# Patient Record
Sex: FEMALE | Race: WHITE | NOT HISPANIC OR LATINO | Employment: OTHER
[De-identification: names, ages, dates, MRNs, and addresses within clinical notes are randomized per-mention and may not be internally consistent; named-entity substitution may affect disease eponyms.]

---

## 2017-02-28 ASSESSMENT — MIFFLIN-ST. JEOR: SCORE: 1819.92

## 2017-03-01 ENCOUNTER — RECORDS - HEALTHEAST (OUTPATIENT)
Dept: ADMINISTRATIVE | Facility: OTHER | Age: 72
End: 2017-03-01

## 2017-03-06 ENCOUNTER — ANESTHESIA - HEALTHEAST (OUTPATIENT)
Dept: SURGERY | Facility: CLINIC | Age: 72
End: 2017-03-06

## 2017-03-06 ENCOUNTER — SURGERY - HEALTHEAST (OUTPATIENT)
Dept: SURGERY | Facility: CLINIC | Age: 72
End: 2017-03-06

## 2017-03-07 ENCOUNTER — HOME CARE/HOSPICE - HEALTHEAST (OUTPATIENT)
Dept: HOME HEALTH SERVICES | Facility: HOME HEALTH | Age: 72
End: 2017-03-07

## 2017-03-07 ASSESSMENT — MIFFLIN-ST. JEOR: SCORE: 1819.92

## 2017-03-09 ENCOUNTER — HOME CARE/HOSPICE - HEALTHEAST (OUTPATIENT)
Dept: HOME HEALTH SERVICES | Facility: HOME HEALTH | Age: 72
End: 2017-03-09

## 2017-03-11 ENCOUNTER — HOME CARE/HOSPICE - HEALTHEAST (OUTPATIENT)
Dept: HOME HEALTH SERVICES | Facility: HOME HEALTH | Age: 72
End: 2017-03-11

## 2017-03-14 ENCOUNTER — HOME CARE/HOSPICE - HEALTHEAST (OUTPATIENT)
Dept: HOME HEALTH SERVICES | Facility: HOME HEALTH | Age: 72
End: 2017-03-14

## 2017-03-16 ENCOUNTER — HOME CARE/HOSPICE - HEALTHEAST (OUTPATIENT)
Dept: HOME HEALTH SERVICES | Facility: HOME HEALTH | Age: 72
End: 2017-03-16

## 2017-03-17 ENCOUNTER — HOME CARE/HOSPICE - HEALTHEAST (OUTPATIENT)
Dept: HOME HEALTH SERVICES | Facility: HOME HEALTH | Age: 72
End: 2017-03-17

## 2017-03-21 ENCOUNTER — HOME CARE/HOSPICE - HEALTHEAST (OUTPATIENT)
Dept: HOME HEALTH SERVICES | Facility: HOME HEALTH | Age: 72
End: 2017-03-21

## 2017-03-24 ENCOUNTER — HOME CARE/HOSPICE - HEALTHEAST (OUTPATIENT)
Dept: HOME HEALTH SERVICES | Facility: HOME HEALTH | Age: 72
End: 2017-03-24

## 2017-04-12 ENCOUNTER — RECORDS - HEALTHEAST (OUTPATIENT)
Dept: ADMINISTRATIVE | Facility: OTHER | Age: 72
End: 2017-04-12

## 2017-05-01 ENCOUNTER — AMBULATORY - HEALTHEAST (OUTPATIENT)
Dept: CARDIOLOGY | Facility: CLINIC | Age: 72
End: 2017-05-01

## 2017-05-04 ENCOUNTER — AMBULATORY - HEALTHEAST (OUTPATIENT)
Dept: CARDIOLOGY | Facility: CLINIC | Age: 72
End: 2017-05-04

## 2017-05-05 ENCOUNTER — OFFICE VISIT - HEALTHEAST (OUTPATIENT)
Dept: CARDIOLOGY | Facility: CLINIC | Age: 72
End: 2017-05-05

## 2017-05-05 ENCOUNTER — AMBULATORY - HEALTHEAST (OUTPATIENT)
Dept: CARDIOLOGY | Facility: CLINIC | Age: 72
End: 2017-05-05

## 2017-05-05 DIAGNOSIS — N17.9 AKI (ACUTE KIDNEY INJURY) (H): ICD-10-CM

## 2017-05-05 DIAGNOSIS — K75.81 NONALCOHOLIC STEATOHEPATITIS (NASH): ICD-10-CM

## 2017-05-05 DIAGNOSIS — I47.10 SVT (SUPRAVENTRICULAR TACHYCARDIA) (H): ICD-10-CM

## 2017-05-05 DIAGNOSIS — I48.0 PAROXYSMAL ATRIAL FIBRILLATION (H): ICD-10-CM

## 2017-05-05 DIAGNOSIS — I48.91 A-FIB (H): ICD-10-CM

## 2017-05-05 RX ORDER — LORATADINE 10 MG/1
10 TABLET ORAL DAILY
Status: SHIPPED | COMMUNITY
Start: 2017-05-05

## 2017-05-05 ASSESSMENT — MIFFLIN-ST. JEOR: SCORE: 1770.03

## 2017-05-08 LAB
ATRIAL RATE - MUSE: 89 BPM
DIASTOLIC BLOOD PRESSURE - MUSE: NORMAL MMHG
INTERPRETATION ECG - MUSE: NORMAL
P AXIS - MUSE: 52 DEGREES
PR INTERVAL - MUSE: 170 MS
QRS DURATION - MUSE: 78 MS
QT - MUSE: 350 MS
QTC - MUSE: 425 MS
R AXIS - MUSE: -16 DEGREES
SYSTOLIC BLOOD PRESSURE - MUSE: NORMAL MMHG
T AXIS - MUSE: 23 DEGREES
VENTRICULAR RATE- MUSE: 89 BPM

## 2017-06-06 ENCOUNTER — AMBULATORY - HEALTHEAST (OUTPATIENT)
Dept: ADMINISTRATIVE | Facility: REHABILITATION | Age: 72
End: 2017-06-06

## 2017-06-06 DIAGNOSIS — I89.0 LYMPHEDEMA: ICD-10-CM

## 2017-06-06 DIAGNOSIS — R60.9 EDEMA: ICD-10-CM

## 2017-06-14 ENCOUNTER — OFFICE VISIT - HEALTHEAST (OUTPATIENT)
Dept: PHYSICAL THERAPY | Facility: REHABILITATION | Age: 72
End: 2017-06-14

## 2017-06-14 DIAGNOSIS — R60.9 EDEMA: ICD-10-CM

## 2017-06-14 DIAGNOSIS — I89.0 LYMPHEDEMA: ICD-10-CM

## 2017-06-14 DIAGNOSIS — Z96.651 STATUS POST TOTAL RIGHT KNEE REPLACEMENT: ICD-10-CM

## 2017-06-29 ENCOUNTER — OFFICE VISIT - HEALTHEAST (OUTPATIENT)
Dept: PHYSICAL THERAPY | Facility: REHABILITATION | Age: 72
End: 2017-06-29

## 2017-06-29 DIAGNOSIS — I89.0 LYMPHEDEMA: ICD-10-CM

## 2017-07-03 ENCOUNTER — OFFICE VISIT - HEALTHEAST (OUTPATIENT)
Dept: PHYSICAL THERAPY | Facility: REHABILITATION | Age: 72
End: 2017-07-03

## 2017-07-03 DIAGNOSIS — I89.0 LYMPHEDEMA: ICD-10-CM

## 2017-07-03 DIAGNOSIS — Z96.651 STATUS POST TOTAL RIGHT KNEE REPLACEMENT: ICD-10-CM

## 2017-07-05 ENCOUNTER — OFFICE VISIT - HEALTHEAST (OUTPATIENT)
Dept: PHYSICAL THERAPY | Facility: REHABILITATION | Age: 72
End: 2017-07-05

## 2017-07-05 DIAGNOSIS — I89.0 LYMPHEDEMA: ICD-10-CM

## 2017-07-05 DIAGNOSIS — R60.9 EDEMA: ICD-10-CM

## 2017-07-05 DIAGNOSIS — Z96.651 STATUS POST TOTAL RIGHT KNEE REPLACEMENT: ICD-10-CM

## 2017-07-07 ENCOUNTER — OFFICE VISIT - HEALTHEAST (OUTPATIENT)
Dept: PHYSICAL THERAPY | Facility: REHABILITATION | Age: 72
End: 2017-07-07

## 2017-07-07 DIAGNOSIS — I89.0 LYMPHEDEMA: ICD-10-CM

## 2017-07-07 DIAGNOSIS — R60.9 EDEMA: ICD-10-CM

## 2017-07-07 DIAGNOSIS — Z96.651 STATUS POST TOTAL RIGHT KNEE REPLACEMENT: ICD-10-CM

## 2017-07-10 ENCOUNTER — OFFICE VISIT - HEALTHEAST (OUTPATIENT)
Dept: PHYSICAL THERAPY | Facility: REHABILITATION | Age: 72
End: 2017-07-10

## 2017-07-10 DIAGNOSIS — Z96.651 STATUS POST TOTAL RIGHT KNEE REPLACEMENT: ICD-10-CM

## 2017-07-10 DIAGNOSIS — I89.0 LYMPHEDEMA: ICD-10-CM

## 2017-08-11 ENCOUNTER — OFFICE VISIT - HEALTHEAST (OUTPATIENT)
Dept: CARDIOLOGY | Facility: CLINIC | Age: 72
End: 2017-08-11

## 2017-08-11 DIAGNOSIS — I48.0 PAROXYSMAL ATRIAL FIBRILLATION (H): ICD-10-CM

## 2017-08-11 DIAGNOSIS — R60.0 PERIPHERAL EDEMA: ICD-10-CM

## 2017-08-11 DIAGNOSIS — I47.10 SVT (SUPRAVENTRICULAR TACHYCARDIA) (H): ICD-10-CM

## 2017-08-11 ASSESSMENT — MIFFLIN-ST. JEOR: SCORE: 1824.46

## 2017-08-25 ENCOUNTER — COMMUNICATION - HEALTHEAST (OUTPATIENT)
Dept: CARDIOLOGY | Facility: CLINIC | Age: 72
End: 2017-08-25

## 2017-10-27 ENCOUNTER — HOSPITAL ENCOUNTER (OUTPATIENT)
Dept: MAMMOGRAPHY | Facility: HOSPITAL | Age: 72
Discharge: HOME | End: 2017-10-27

## 2017-10-27 DIAGNOSIS — Z12.31 VISIT FOR SCREENING MAMMOGRAM: ICD-10-CM

## 2017-11-24 ENCOUNTER — AMBULATORY - HEALTHEAST (OUTPATIENT)
Dept: CARDIOLOGY | Facility: CLINIC | Age: 72
End: 2017-11-24

## 2017-11-24 ENCOUNTER — RECORDS - HEALTHEAST (OUTPATIENT)
Dept: ADMINISTRATIVE | Facility: OTHER | Age: 72
End: 2017-11-24

## 2018-05-07 ENCOUNTER — OFFICE VISIT - HEALTHEAST (OUTPATIENT)
Dept: CARDIOLOGY | Facility: CLINIC | Age: 73
End: 2018-05-07

## 2018-05-07 DIAGNOSIS — I47.10 SVT (SUPRAVENTRICULAR TACHYCARDIA) (H): ICD-10-CM

## 2018-05-07 DIAGNOSIS — R60.0 PERIPHERAL EDEMA: ICD-10-CM

## 2018-05-07 DIAGNOSIS — I48.0 PAROXYSMAL ATRIAL FIBRILLATION (H): ICD-10-CM

## 2018-05-07 RX ORDER — PRAVASTATIN SODIUM 20 MG
20 TABLET ORAL DAILY
Status: SHIPPED | COMMUNITY
Start: 2017-09-08 | End: 2022-07-05

## 2018-05-07 RX ORDER — FUROSEMIDE 20 MG
20 TABLET ORAL DAILY
Status: SHIPPED
Start: 2018-05-07

## 2018-05-07 ASSESSMENT — MIFFLIN-ST. JEOR: SCORE: 1856.21

## 2019-01-11 ENCOUNTER — HOSPITAL ENCOUNTER (OUTPATIENT)
Dept: MAMMOGRAPHY | Facility: CLINIC | Age: 74
Discharge: HOME | End: 2019-01-11

## 2019-01-11 DIAGNOSIS — Z12.31 VISIT FOR SCREENING MAMMOGRAM: ICD-10-CM

## 2019-02-18 ENCOUNTER — COMMUNICATION - HEALTHEAST (OUTPATIENT)
Dept: CARDIOLOGY | Facility: CLINIC | Age: 74
End: 2019-02-18

## 2020-07-14 ENCOUNTER — OFFICE VISIT - HEALTHEAST (OUTPATIENT)
Dept: CARDIOLOGY | Facility: CLINIC | Age: 75
End: 2020-07-14

## 2020-07-14 ENCOUNTER — COMMUNICATION - HEALTHEAST (OUTPATIENT)
Dept: CARDIOLOGY | Facility: CLINIC | Age: 75
End: 2020-07-14

## 2020-07-14 DIAGNOSIS — I48.19 PERSISTENT ATRIAL FIBRILLATION (H): ICD-10-CM

## 2020-07-14 DIAGNOSIS — I48.11 LONGSTANDING PERSISTENT ATRIAL FIBRILLATION (H): ICD-10-CM

## 2020-07-14 DIAGNOSIS — I47.10 SVT (SUPRAVENTRICULAR TACHYCARDIA) (H): ICD-10-CM

## 2020-07-14 DIAGNOSIS — I48.0 PAROXYSMAL ATRIAL FIBRILLATION (H): ICD-10-CM

## 2020-07-14 RX ORDER — PREDNISONE 2.5 MG/1
TABLET ORAL
Status: SHIPPED | COMMUNITY
Start: 2020-06-08 | End: 2022-07-05

## 2020-07-14 RX ORDER — DILTIAZEM HYDROCHLORIDE 120 MG/1
CAPSULE, COATED, EXTENDED RELEASE ORAL
Qty: 90 CAPSULE | Refills: 0 | Status: SHIPPED | OUTPATIENT
Start: 2020-07-14

## 2020-07-15 ENCOUNTER — HOSPITAL ENCOUNTER (OUTPATIENT)
Dept: CARDIOLOGY | Facility: CLINIC | Age: 75
Discharge: HOME | End: 2020-07-15
Attending: NURSE PRACTITIONER

## 2020-07-15 DIAGNOSIS — I48.19 PERSISTENT ATRIAL FIBRILLATION (H): ICD-10-CM

## 2020-07-15 LAB
AORTIC ROOT: 3.5 CM
BSA FOR ECHO PROCEDURE: 2.44 M2
CV BLOOD PRESSURE: ABNORMAL MMHG
CV ECHO HEIGHT: 63 IN
CV ECHO WEIGHT: 296 LBS
DOP CALC LVOT AREA: 3.14 CM2
DOP CALC LVOT DIAMETER: 2 CM
DOP CALC LVOT PEAK VEL: 91.9 CM/S
DOP CALC LVOT STROKE VOLUME: 54.3 CM3
DOP CALCLVOT PEAK VEL VTI: 17.3 CM
EJECTION FRACTION: 54 % (ref 55–75)
FRACTIONAL SHORTENING: 31.8 % (ref 28–44)
INTERVENTRICULAR SEPTUM IN END DIASTOLE: 1.3 CM (ref 0.6–0.9)
IVS/PW RATIO: 0.9
LA AREA 1: 27.9 CM2
LA AREA 2: 21.2 CM2
LEFT ATRIUM LENGTH: 5.55 CM
LEFT ATRIUM SIZE: 4.6 CM
LEFT ATRIUM TO AORTIC ROOT RATIO: 1.31 NO UNITS
LEFT ATRIUM VOLUME INDEX: 37.1 ML/M2
LEFT ATRIUM VOLUME: 90.6 ML
LEFT VENTRICLE CARDIAC INDEX: 2 L/MIN/M2
LEFT VENTRICLE CARDIAC OUTPUT: 5 L/MIN
LEFT VENTRICLE DIASTOLIC VOLUME INDEX: 27.9 CM3/M2 (ref 29–61)
LEFT VENTRICLE DIASTOLIC VOLUME: 68 CM3 (ref 46–106)
LEFT VENTRICLE HEART RATE: 92 BPM
LEFT VENTRICLE MASS INDEX: 93.2 G/M2
LEFT VENTRICLE SYSTOLIC VOLUME INDEX: 12.7 CM3/M2 (ref 8–24)
LEFT VENTRICLE SYSTOLIC VOLUME: 31 CM3 (ref 14–42)
LEFT VENTRICULAR INTERNAL DIMENSION IN DIASTOLE: 4.4 CM (ref 3.8–5.2)
LEFT VENTRICULAR INTERNAL DIMENSION IN SYSTOLE: 3 CM (ref 2.2–3.5)
LEFT VENTRICULAR MASS: 227.5 G
LEFT VENTRICULAR OUTFLOW TRACT MEAN GRADIENT: 2 MMHG
LEFT VENTRICULAR OUTFLOW TRACT MEAN VELOCITY: 62 CM/S
LEFT VENTRICULAR OUTFLOW TRACT PEAK GRADIENT: 3 MMHG
LEFT VENTRICULAR POSTERIOR WALL IN END DIASTOLE: 1.4 CM (ref 0.6–0.9)
LV STROKE VOLUME INDEX: 22.3 ML/M2
MV AVERAGE E/E' RATIO: 12 CM/S
MV DECELERATION TIME: 118 MS
MV E'TISSUE VEL-LAT: 10.2 CM/S
MV E'TISSUE VEL-MED: 6.66 CM/S
MV LATERAL E/E' RATIO: 9.9
MV MEDIAL E/E' RATIO: 15.2
MV PEAK E VELOCITY: 101 CM/S
NUC REST DIASTOLIC VOLUME INDEX: 4736 LBS
NUC REST SYSTOLIC VOLUME INDEX: 63 IN
POC INR - HE - HISTORICAL: 4.8 (ref 0.9–1.1)
TRICUSPID REGURGITATION PEAK PRESSURE GRADIENT: 30 MMHG
TRICUSPID VALVE ANULAR PLANE SYSTOLIC EXCURSION: 1.5 CM
TRICUSPID VALVE PEAK REGURGITANT VELOCITY: 274 CM/S

## 2020-07-15 ASSESSMENT — MIFFLIN-ST. JEOR: SCORE: 1801.78

## 2020-07-20 ENCOUNTER — COMMUNICATION - HEALTHEAST (OUTPATIENT)
Dept: CARDIOLOGY | Facility: CLINIC | Age: 75
End: 2020-07-20

## 2021-05-28 ENCOUNTER — RECORDS - HEALTHEAST (OUTPATIENT)
Dept: ADMINISTRATIVE | Facility: CLINIC | Age: 76
End: 2021-05-28

## 2021-05-30 ENCOUNTER — RECORDS - HEALTHEAST (OUTPATIENT)
Dept: ADMINISTRATIVE | Facility: CLINIC | Age: 76
End: 2021-05-30

## 2021-05-30 ENCOUNTER — HEALTH MAINTENANCE LETTER (OUTPATIENT)
Age: 76
End: 2021-05-30

## 2021-05-30 VITALS — WEIGHT: 293 LBS | HEIGHT: 63 IN | BODY MASS INDEX: 51.91 KG/M2

## 2021-05-30 VITALS — WEIGHT: 289 LBS | BODY MASS INDEX: 51.21 KG/M2 | HEIGHT: 63 IN

## 2021-05-31 VITALS — BODY MASS INDEX: 51.91 KG/M2 | HEIGHT: 63 IN | WEIGHT: 293 LBS

## 2021-06-01 VITALS — BODY MASS INDEX: 51.91 KG/M2 | HEIGHT: 63 IN | WEIGHT: 293 LBS

## 2021-06-04 VITALS
OXYGEN SATURATION: 98 % | HEART RATE: 84 BPM | WEIGHT: 293 LBS | RESPIRATION RATE: 18 BRPM | BODY MASS INDEX: 52.43 KG/M2 | DIASTOLIC BLOOD PRESSURE: 64 MMHG | SYSTOLIC BLOOD PRESSURE: 108 MMHG

## 2021-06-04 VITALS — BODY MASS INDEX: 51.91 KG/M2 | WEIGHT: 293 LBS | HEIGHT: 63 IN

## 2021-06-09 NOTE — TELEPHONE ENCOUNTER
I called Vaishali with echo results which show EF normal.  No significant valve disease.  Left atrium mild to moderately enlarged and right atrium mildly enlarged.  I discussed that she is likely been in A. fib for 3 months but not likely more than 6 months.  That is a very rough estimate.  She denies any symptoms with A. fib so I recommended rate control.  To continue with increased dose of diltiazem as I prescribed yesterday in clinic.  24-hour Holter when she returns from her trip on August 16.  I discussed that she if she wants to go back to normal rhythm [place] let me know within the next month.

## 2021-06-09 NOTE — TELEPHONE ENCOUNTER
"----- Message from Janel Hussein sent at 7/20/2020  2:11 PM CDT -----  Regarding: INR for Brendan Lee  Caller: Vaishali    Primary cardiologist: Brendan Lee    Detailed reason for call: Vaishali states she is reporting as instructed that her INR today is 1.4 and that Brendan wanted this information in order to call her back to let her know \"what she should be taking\". Please call back.     Best phone number: [phone]     Best time to contact: Today    Ok to leave a detailed message? Yes    Device? No    "

## 2021-06-09 NOTE — ANESTHESIA POSTPROCEDURE EVALUATION
Patient: Vaishali Bernal  #3  RIGHT TOTAL KNEE ARTHROPLASTY  Anesthesia type: general    Patient location: PACU  Last vitals:   Vitals:    03/06/17 1400   BP:    Pulse:    Resp: (P) 16   Temp:    SpO2:      Post vital signs: stable  Level of consciousness: awake and responds to simple questions  Post-anesthesia pain: pain controlled  Post-anesthesia nausea and vomiting: no  Pulmonary: unassisted, return to baseline  Cardiovascular: stable and blood pressure at baseline  Hydration: adequate  Anesthetic events: no    QCDR Measures:  ASA# 11 - Beena-op Cardiac Arrest: ASA11B - Patient did NOT experience unanticipated cardiac arrest  ASA# 12 - Beena-op Mortality Rate: ASA12B - Patient did NOT die  ASA# 13 - PACU Re-Intubation Rate: ASA13B - Patient did NOT require a new airway mgmt  ASA# 10 - Composite Anes Safety: ASA10A - No serious adverse event  ASA# 38 - New Corneal Injury: ASA38A - No new exposure keratitis or corneal abrasion in PACU    Additional Notes:

## 2021-06-09 NOTE — ANESTHESIA PREPROCEDURE EVALUATION
Anesthesia Evaluation      Patient summary reviewed     Airway   Mallampati: III  Neck ROM: full   Pulmonary - normal exam   (+) asthma  sleep apnea on no CPAP, ,                          Cardiovascular   (+) hypertension, dysrhythmias, , hypercholesterolemia,     Rhythm: regular     ROS comment:   Hx of SVT     Neuro/Psych      Comments:   DDD    Endo/Other    (+) obesity,      Comments: Morbid obesity    GI/Hepatic/Renal    (+) GERD well controlled,        Other findings:         Dental - normal exam                        Anesthesia Plan  Planned anesthetic: general LMA and peripheral nerve block    ACB and selective tibial block  ASA 3   Induction: intravenous   Anesthetic plan and risks discussed with: patient and spouse  Anesthesia plan special considerations: antiemetics,   Post-op plan: routine recovery        Cheryle Ramírez MD  Staff Anesthesiologist  [facility]  [facility]  3/6/17

## 2021-06-09 NOTE — TELEPHONE ENCOUNTER
Reviewed with patient that Brendan is not here today to give her instructions.  Patient will take her normal dose today and await further instructions from Brendan tomorrow.  ROSALINDA

## 2021-06-09 NOTE — ANESTHESIA PROCEDURE NOTES
Peripheral Block    Patient location during procedure: pre-op  Start time: 3/6/2017 10:35 AM  End time: 3/6/2017 10:38 AM  post-op analgesia per surgeon order as noted in medical record  Staffing:  Performing  Anesthesiologist: CHERYLE RAMÍREZ  Preanesthetic Checklist  Completed: patient identified, site marked, risks, benefits, and alternatives discussed, timeout performed, consent obtained, airway assessed, oxygen available, suction available, emergency drugs available and hand hygiene performed  Peripheral Block  Block type: saphenous, adductor canal block  Prep: ChloraPrep  Patient position: supine  Patient monitoring: cardiac monitor, continuous pulse oximetry, heart rate and blood pressure  Laterality: right  Injection technique: ultrasound guided    Ultrasound used to visualize needle placement in proximity to nerve being blocked: yes   Permanent ultrasound image captured for medical record    Needle  Needle type: Stimuplex   Needle gauge: 21 G  Needle length: 6 in  no peripheral nerve catheter placed  Assessment  Injection assessment: no difficulty with injection, negative aspiration for heme, no paresthesia on injection and incremental injection  Additional Notes    Right ACB with bupivacaine 0.5% with epi 20 cc.    Cheryle Ramírez MD  Staff Anesthesiologist  [facility]  [facility]

## 2021-06-09 NOTE — PATIENT INSTRUCTIONS - HE
Vaishali Bernal,    It was a pleasure to see you today at the [facility].     My recommendations after this visit include:    Complete ECHO within the next week or so.    Add on diltiazem 120 mg 1 capsule orally every day to the 240 mg capsule every day.    I will call you with results of ECHO.  If you have been in atrial fibrillation for months, I will recommend stopping diltiazem and starting sotalol 2 days prior to cardioversion.  If it doesn't look like you have been in atrial fibrillation for long, then I will recommend cardioversion without medication changes.    INR next Monday-July 20th and weekly.    My contact information:  Brendan Lee, ALISSA  After Hours or Scheduling  [phone]  My Nurse---Rani Laguna [phone]

## 2021-06-09 NOTE — ANESTHESIA CARE TRANSFER NOTE
Last vitals:   Vitals:    03/06/17 1328   BP: 134/79   Pulse: 84   Resp: 16   Temp: 36.9  C (98.5  F)   SpO2: 100%     Patient's level of consciousness is drowsy  Spontaneous respirations: yes  Maintains airway independently: yes  Dentition unchanged: yes  Oropharynx: oropharynx clear of all foreign objects    QCDR Measures:  ASA# 20 - Surgical Safety Checklist: ASA20A - Safety Checks Done  PQRS# 430 - Adult PONV Prevention: 4558F - Pt received => 2 anti-emetic agents (different classes) preop & intraop  ASA# 8 - Peds PONV Prevention: NA - Not pediatric patient, not GA or 2 or more risk factors NOT present  PQRS# 424 - Beena-op Temp Management: 4559F - At least one body temp DOCUMENTED => 35.5C or 95.9F within required timeframe  PQRS# 426 - PACU Transfer Protocol: - Transfer of care checklist used  ASA# 14 - Acute Post-op Pain: ASA14B - Patient did NOT experience pain >= 7 out of 10    I completed my SBAR handoff to the receiving nurse per policy and procedure.  To PACU with O2. VSS. Report to TRU Olson.

## 2021-06-09 NOTE — TELEPHONE ENCOUNTER
Vaishali was called by her warfarin [facility] nurse yesterday.  She put her back on her regular dose of 4 mg of warfarin 3 days a week and 6 mg the other 4 days a week.  She has a repeat INR in 1 week.  To follow instructions given.  She is reporting that she is not having any problems with increasing diltiazem dose and noting heart rates now running in the 80s instead of 90s at rest.

## 2021-06-09 NOTE — ANESTHESIA PROCEDURE NOTES
Peripheral Block    Patient location during procedure: pre-op  Start time: 3/6/2017 10:30 AM  End time: 3/6/2017 10:35 AM  post-op analgesia per surgeon order as noted in medical record  Staffing:  Performing  Anesthesiologist: CHERYLE RAMÍREZ  Preanesthetic Checklist  Completed: patient identified, site marked, risks, benefits, and alternatives discussed, timeout performed, consent obtained, airway assessed, oxygen available, suction available, emergency drugs available and hand hygiene performed  Peripheral Block  Block type: other, tibial  Prep: ChloraPrep  Patient position: supine  Patient monitoring: cardiac monitor, continuous pulse oximetry, heart rate and blood pressure  Laterality: right  Injection technique: ultrasound guided    Ultrasound used to visualize needle placement in proximity to nerve being blocked: yes   Permanent ultrasound image captured for medical record    Needle  Needle type: Stimuplex   Needle gauge: 21 G  Needle length: 6 in  no peripheral nerve catheter placed  Assessment  Injection assessment: no difficulty with injection, negative aspiration for heme, no paresthesia on injection and incremental injection  Additional Notes    Right selective tibial block with bupivacaine 0.25% 5 cc.    Cheryle Ramírez MD  Staff Anesthesiologist  [facility]  [facility]

## 2021-06-09 NOTE — TELEPHONE ENCOUNTER
Message left on cell phone to call with regard to warfarin dosing.  Not able to reach at home number either.

## 2021-06-10 NOTE — PROGRESS NOTES
[facility]   Arrhythmia Clinic    Assessment/Plan:  Diagnoses and all orders for this visit:    Paroxysmal atrial fibrillation and clearly symptomatic.  TSH normal during hospital stay.  Echo shows no valvular heart disease.  With morbid obesity question MONICA as likely because of paroxysmal A. fib.  [id number] score of 2 for female gender and age 65-74.  Guidelines recommend anticoagulation.  Currently on warfarin and INR is slightly elevated likely due to liver function.  Due to supplemental insurance of [employer] recommended and discussed Xarelto 20 mg 1 tablet orally every day with the same meal.  Discussed reversal agent currently not available yet but coming.  Discussed importance of not missing any doses and taking this with a meal.  Discussed short half-life with Xarelto and differences from warfarin.  Would prefer not to have INR checks and watch green leafy vegetables.  Vaishali would like to switch to a novel agent if it is affordable.  She is going to check on cost and when I call her with lab results on Monday, we may decide to make med change.  Will hold note until then.  She is requesting to follow with me in clinic so will cancel appointment with Dr. Miguel in June and to see me in 3 months.  I initially thought that she had a nuclear stress test ordered during hospital stay but this was not the case.  To disregard instructions regarding stress test.  On followup phone call, she is going to stay on warfarin for now as Xarelto is expensive for her.  I discussed pathophysiology of atrial fib and natural progression.  I discussed treatment options of antiarrhythmics in the future if increased progression.  I discussed that atrial fib is not life-threatening and major concern is covering stroke risk and then if she is having lots of atrial fib to make sure that her heart rate is controlled.  Recommended that she NOT go to the emergency room with future episodes unless dramatic symptoms as on correct  medications.  I reviewed the call system in our clinic and to call if afib episodes more frequent.    A-fib  -     ECG Clinic - Today    SVT (supraventricular tachycardia) history and no previous episodes longer than 15 minutes.  Symptoms were not dramatic with SVT.    Nonalcoholic steatohepatitis (JOHNSON) and recommended recheck of hepatic profile.  If LFTs remained elevated she may be safer on novel anticoagulant.  If LFTs remain elevated she may benefit from referral to gastroenterologist for further workup.  I will defer this to Dr. Ordonez who she has followed with for years.  I am suspicious that Tylenol overdose may have caused elevation in liver functions due to per patient report.  ALT and AST nl now.  -     Hepatic Profile    KATHERINE (acute kidney injury) and recommended recheck as want to recommend novel agent for anticoagulation if normal kidney function.  Historically appears to have normal kidney function and kidney function normalized when labs back the next day.  -     Basic Metabolic Panel    Other orders  -     loratadine (CLARITIN) 10 mg tablet; Take 10 mg by mouth daily.  40 minutes were spent in face-to-face counseling regarding above diagnoses and options for treatment.    ______________________________________________________________________    Subjective:    I had the opportunity to see Vaishali Bernal at the [facility]. Vaishali Bernal is a 71 y.o. female and here for EP follow-up after being hospitalized on April 12-14 for new onset atrial fib and was noted to have acute kidney injury and abnormal liver functions.  She had a total knee replacement in March and tells me that she was taking more Tylenol than she should have been.  Looking in her primary care clinic chart I see no elevated LFTs in the past.  In hospital records it shows Johnson as diagnosis for elevated LFTs.  With atrial fib, she feels palpitations, fatigue and short of breath.  She went to primary care on April 12  that she knew she was not in normal rhythm.  She has a previous history of SVT but these episodes were lasting longer than any of her SVT episodes.  Usually SVT is aborted with vagal maneuvers or sticking her head in the freezer.  I believe she reverted back to normal rhythm during the hospitalization but it is difficult to tell from the record.  She reports that she has had no further episodes of SVT or atrial fib since discharge from the hospital.  She is under a lot of stress that she still doing physical therapy post knee replacement and her  is ill.  He has had numerous lung biopsies and sarcoidosis of uncertain etiology.  Vaishali has a strong support system and is close to her grandchildren.  She denies any previous history of coronary artery disease, hypertension or diabetes.  She has been referred to a sleep physician and will follow up with this.  Her INR was elevated at last check and they have decreased her dose each time she has had INR done.  Eliquis was recommended by her primary care physician but not covered under Medicare.  See above for more details.  She has some shortness of breath with walking longer distances but is unchanged from previous.  She denies other cardiac symptoms when not in A. fib.  ______________________________________________________________________    Problem List:  Patient Active Problem List   Diagnosis     Osteoarthritis of left knee     Acute blood loss anemia     Osteoarthritis of right knee     Paroxysmal atrial fibrillation     KATHERINE (acute kidney injury)     Abnormal LFTs     SVT (supraventricular tachycardia)     Nonalcoholic steatohepatitis (CRAIG)     Medical History:  Past Medical History:   Diagnosis Date     Arthritis      Asthma      Back pain      Hypertension      Lumbar stenosis      SVT (supraventricular tachycardia)      Surgical History:  Past Surgical History:   Procedure Laterality Date     APPENDECTOMY       CHOLECYSTECTOMY       HERNIA REPAIR        HYSTERECTOMY  1977     JOINT REPLACEMENT      knee     knee meniscus repair Left      OOPHORECTOMY Bilateral 1974     RI TOTAL KNEE ARTHROPLASTY Left 1/4/2016    Procedure: LEFT KNEE TOTAL ARTHROPLASTY;  Surgeon: Ash Campa MD;  Location: [facility] OR;  Service: Orthopedics     RI TOTAL KNEE ARTHROPLASTY Right 3/6/2017    Procedure: #3  RIGHT TOTAL KNEE ARTHROPLASTY;  Surgeon: Ash Campa MD;  Location: [facility];  Service: Orthopedics     TONSILLECTOMY       Social History:  Social History   Substance Use Topics     Smoking status: Never Smoker     Smokeless tobacco: None     Alcohol use No        Review of Systems: Review of Systems:   General: WNL  Eyes: WNL  Ears/Nose/Throat: WNL  Lungs: Shortness of Breath  Heart: WNL  Stomach: Heartburn  Bladder: WNL  Muscle/Joints: WNL  Skin: WNL  Nervous System: WNL  Mental Health: WNL     Blood: WNL      Family History:  Family History   Problem Relation Age of Onset     Breast cancer Maternal Aunt 85     Breast cancer Cousin      Anesthesia problems Neg Hx          Allergies:  Allergies   Allergen Reactions     Aspirin Other (See Comments)     Nose bleeds when taking large amount-many years ago, and dry hands. Tolerated ASA when here for L knee last January     Iodine Hives     Levaquin [Levofloxacin] Other (See Comments)     Tendon pain     Sulfa (Sulfonamide Antibiotics) Unknown     From clinic list     Ceftin [Cefuroxime Axetil] Rash     Patient did have cefazolin 1/2016 without reaction with last knee surgery     Naproxen Rash     Nose bleeds and skin peels from palms and head       Novocain [Procaine] Hives and Rash     As a child     Medications:  Current Outpatient Prescriptions   Medication Sig Dispense Refill     acetaminophen (TYLENOL) 500 MG tablet Take 1,000 mg by mouth 3 (three) times a day as needed for pain.       cetirizine (ZYRTEC) 10 MG tablet Take 10 mg by mouth daily.       cholecalciferol, vitamin D3, 1,000 unit tablet Take 1,000  "Units by mouth daily.       diltiazem (CARDIZEM CD) 120 MG 24 hr capsule Take 120 mg by mouth daily. (Take with 240mg cap = 360mg total dose)       diltiazem (CARDIZEM CD) 240 MG 24 hr capsule Take 240 mg by mouth daily. (take with 120mg cap = 360mg total)       fluocinonide (LIDEX) 0.05 % external solution Apply 1 application topically bedtime as needed.       fluticasone (FLONASE) 50 mcg/actuation nasal spray Apply 2 sprays into each nostril daily.       furosemide (LASIX) 20 MG tablet Take 20 mg by mouth daily.       ketotifen (ZADITOR/ZYRTEC ITCHY EYES) 0.025 % (0.035 %) ophthalmic solution Administer 1 drop to both eyes 2 (two) times a day as needed.       loratadine (CLARITIN) 10 mg tablet Take 10 mg by mouth daily.       losartan (COZAAR) 100 MG tablet Take 100 mg by mouth daily.       MULTIVITAMIN ORAL Take 1 tablet by mouth daily.       omeprazole (PRILOSEC) 20 MG capsule Take 20 mg by mouth Daily before breakfast.        potassium chloride (KLOR-CON) 10 MEQ CR tablet Take 10 mEq by mouth daily.       warfarin (COUMADIN) 4 MG tablet Take 4 mg daily. Check INR on 4/17/17, Monday.  Adjust dose based on INR results as directed. 60 tablet 0     albuterol (PROVENTIL HFA;VENTOLIN HFA) 90 mcg/actuation inhaler Inhale 2 puffs every 6 (six) hours as needed for wheezing.       albuterol (PROVENTIL) 2.5 mg /3 mL (0.083 %) nebulizer solution Take 2.5 mg by nebulization every 6 (six) hours as needed for wheezing.       No current facility-administered medications for this visit.        Objective:   Vital signs:  /79 (Patient Site: Left Arm, Patient Position: Sitting, Cuff Size: Adult Large)  Pulse 88  Resp 20  Ht 5' 3\" (1.6 m)  Wt (!) 289 lb (131.1 kg)  LMP 09/18/1977  BMI 51.19 kg/m2      Physical Exam:    GENERAL APPEARANCE: Alert, cooperative and in no acute distress.  HEENT: No scleral icterus. No Xanthelasma. Oral mucuos membranes pink and moist.  NECK: JVP Nl.    Lung sounds clear " throughout.  CARDIOVASCULAR: S1, S2 without murmur ,clicks or rubs. Regular, regular.  Radial and posterior tibial pulses are intact and symetric.   EXTREMITIES: No cyanosis, clubbing .  1+ edema in left lower extremity but has history of lymphedema and wearing compression stockings bilaterally.  No edema on right.    Results personally reviewed:  Results for orders placed during the hospital encounter of 04/12/17   Echo Complete [ECH10] 04/13/2017    Narrative   No previous study for comparison.    No hemodynamically significant valvular heart abnormalities.    Left ventricle ejection fraction is normal. The estimated left   ventricular ejection fraction is 70%.              Results for orders placed or performed in visit on 05/05/17   ECG Clinic - Today   Result Value Ref Range    SYSTOLIC BLOOD PRESSURE  mmHg    DIASTOLIC BLOOD PRESSURE  mmHg    VENTRICULAR RATE 89 BPM    ATRIAL RATE 89 BPM    P-R INTERVAL 170 ms    QRS DURATION 78 ms    Q-T INTERVAL 350 ms    QTC CALCULATION (BEZET) 425 ms    P Axis 52 degrees    R AXIS -16 degrees    T AXIS 23 degrees    MUSE DIAGNOSIS       Normal sinus rhythm  Normal ECG  When compared with ECG of 12-APR-2017 22:59,  Sinus rhythm has replaced Atrial fibrillation         TSH:   Lab Results   Component Value Date    TSH 1.17 04/14/2017     BNP: No results found for: BNP  BMP:  Lab Results   Component Value Date    CREATININE 1.16 (H) 04/13/2017    BUN 15 04/13/2017     04/13/2017    K 4.6 04/13/2017     04/13/2017    CO2 26 04/13/2017       This note has been dictated using voice recognition software. Any grammatical or context distortions are unintentional and inherent to the software.    KYM HILL RN, [facility]  [phone]

## 2021-06-11 NOTE — PROGRESS NOTES
"[facility] Daily Progress     Patient Name: Vaishali Bernal  Date: 7/3/2017  Visit #: 3  PTA visit #:  -  Referral Diagnosis: right lower extremity lymphedema  Referring provider: Yung Ordonez MD  Visit Diagnosis:     ICD-10-CM    1. Lymphedema I89.0    2. Status post total right knee replacement Z96.651          Assessment:     Mild increased of right knee edema, added bandaging above the knee.    Patient demonstrates understanding/independence with home program.  Patient is benefitting from skilled physical therapy and is making steady progress toward functional goals.  Patient is appropriate to continue with skilled physical therapy intervention, as indicated by initial plan of care.    Goal Status:  Pt. will demonstrate/verbalize independence in self-management of condition in : 6 weeks;Progressing toward  Pt. will be able to walk : 10 minutes;20 minutes;around the house;6 blocks;with less pain;with less difficulty;for household mobility;for community mobility;for exercise/recreation;in 6 weeks;Progressing toward  Patient will ascend / descend: stairs;independently;with less pain;with less difficulty;in 6 weeks;Progressing toward  Patient will have a decreased volume in : right;LE;by 5%;to decrease risk of infection;for better fit of clothing;for improved body image;for ease of movement;in 6 weeks;Progressing toward  Patient will perform, verbalize self-management of: skin care;self-monitoring;exercise;massage;self-compression;compression wear;compression care;infection prevention;in 6 weeks;Progressing toward    Plan / Patient Education:     Continue with initial plan of care.  Progress with home program as tolerated. Continue with manual lymph drainage, medical compression bandages, exercises, skin care, and patient education.Coordinate compression garment fitting.      Subjective:     Pain Ratin  \"the bandages stay for 2 days and then I had my compression stocking.      Objective: " "    Caregiver present: No    Observation of swelling: right lower extremity with mild increased of edema at knee, reduces with MLD.     Volume measurements taken:  No      Skin condition is:  Intact.    Compression:  Knee high compression stockings.    Medication for infection:  No    Treatment Today       New compression garment: has compression stockings.    TREATMENT MINUTES COMMENTS   Evaluation     Self-care/ Home management 30 Medical compression bandaging to right lower extremity from forefoot to below the knee: Applied  Eucerin skin lotion, stockinet, ,artiflex 01 roll, comprilan 03 rolls.Used a small piece of Tubigrip on top to protect bandages.Added from the knee to upper thigh: stockinete size \"J\", 2 rolls of artiflex from below the knee to thigh, 1 roll of isoband for upper thigh and compresso  size \"J\".  Instructed to remove them if uncomfortable.     Manual therapy 25 Manual therapy: manual lymph drainage for right lower extremity lymphedema  Deep abdominal breath, neck effleurage, short neck, shoulder collectors, bilateral axilla, bilateral inguinal, anterior inguinal to axilla anastomosis on right side, right lower extremity evacuation, abdomen, neck effleurage.     Neuromuscular Re-education     Therapeutic Activity     Therapeutic Exercises 5 Instructed to continue with her lymphedema exercises   Gait training     Modality__________________                Total 60    Blank areas are intentional and mean the treatment did not include these items.       Hamida Knight PT, JAILYN-EARLINE  7/3/2017    "

## 2021-06-11 NOTE — PROGRESS NOTES
[facility] Daily Progress     Patient Name: Vaishali Bernal  Date: 7/7/2017  Visit #: 5/12  PTA visit #:  2  Referral Diagnosis: right lower extremity lymphedema  Referring provider: Yung Ordonez MD  Visit Diagnosis:     ICD-10-CM    1. Lymphedema I89.0    2. Status post total right knee replacement Z96.651    3. Edema R60.9          Assessment:     Significant reduction in R LE edema, both lower and upper leg. Knee had wrinkling evident after MT    Patient demonstrates understanding/independence with home program.  Patient is benefitting from skilled physical therapy and is making steady progress toward functional goals.  Patient is appropriate to continue with skilled physical therapy intervention, as indicated by initial plan of care.    Goal Status:  Pt. will demonstrate/verbalize independence in self-management of condition in : 6 weeks;Progressing toward  Pt. will be able to walk : 10 minutes;20 minutes;around the house;6 blocks;with less pain;with less difficulty;for household mobility;for community mobility;for exercise/recreation;in 6 weeks;Progressing toward  Patient will ascend / descend: stairs;independently;with less pain;with less difficulty;in 6 weeks;Progressing toward  Patient will have a decreased volume in : right;LE;by 5%;to decrease risk of infection;for better fit of clothing;for improved body image;for ease of movement;in 6 weeks;Progressing toward  Patient will perform, verbalize self-management of: skin care;self-monitoring;exercise;massage;self-compression;compression wear;compression care;infection prevention;in 6 weeks;Progressing toward    Plan / Patient Education:     Continue with initial plan of care.  Progress with home program as tolerated. Continue with manual lymph drainage, medical compression bandages, exercises, skin care, and patient education.Coordinate compression garment fitting.  Trial today of bandaging to knee with compression capris  Re measure next  visit to assess effectiveness of capris with low leg wrap vs compression bandaging to groin    Subjective:     Pain Ratin  Bandaging removed this am , some ridging evident  And appeared near normal.       Objective:     Caregiver present: No    Observation of swelling: R LE appears near normal to pt, appears smaller than L, including thigh    Volume measurements taken:  No      Skin condition is:  Intact.  Rash unchanged per pt    Compression:None on  Knee high compression stocking on l    Medication for infection:  No    New compression garment: has compression stockings, both knee high and thigh high, both about a year old      Treatment today:  TREATMENT MINUTES COMMENTS   Evaluation     Self-care/ Home management 25 Medical compression bandaging to right lower extremity from forefoot to knee: Applied  Eucerin skin lotion, stockinet, ,artiflex 02 roll on low leg, 1 roll of Rosidal, comprilan 03 rolls., Comperm G over all.  Instructed to remove them if uncomfortable, or if skin becomes wet or itchy     Manual therapy 35 Manual therapy: manual lymph drainage for right lower extremity lymphedema  Deep abdominal breath,neck, trunk and R LE evacuation   Neuromuscular Re-education     Therapeutic Activity     Therapeutic Exercises  Instructed to continue with her lymphedema exercises   Gait training     Modality__________________                Total 60    Blank areas are intentional and mean the treatment did not include these items.       Vicki Stevens PTA,CLT  2017

## 2021-06-11 NOTE — PROGRESS NOTES
[facility]   Lymphedema Initial Evaluation      Patient Name: Vaishali Bernal  Date of evaluation: 6/14/2017  Visit number: 1/12  Referring provider: Yung Ordonez MD  Visit Diagnosis:     ICD-10-CM    1. Lymphedema I89.0    2. Edema R60.9    3. Status post total right knee replacement Z96.651        Assessment:      Vaishali Bernal is a 71 y.o. female who presents to therapy today with chief complaints of R LE swelling. Onset date of sx was April 2017.  Pt reported h/o R TKA on 3/6/17 and did well until April when swelling started to occur.  Pt has a h/o lymphedema on her L leg for 20+ years as well as a L TKA last year and multiple abdominal surgeries over the years.  Pain symptoms are mild to severe depending on swelling.  Functional impairments include difficulty with kneel/squat, house and yard work, walking and negotiating stairs.  Pt demo's signs and sx consistent with R LE TKA with lymphedema.  Pt has limited R knee ROM and mild strength deficits with abnormal gait pattern.     Pt. is appropriate for skilled PT intervention as outlined in the Plan of Care (POC).  Pt. is a good candidate for skilled PT services to improve pain levels and function.    Goals:   Pt. will demonstrate/verbalize independence in self-management of condition in : 6 weeks  Pt. will be able to walk : 10 minutes;20 minutes;around the house;6 blocks;with less pain;with less difficulty;for household mobility;for community mobility;for exercise/recreation;in 6 weeks  Patient will ascend / descend: stairs;independently;with less pain;with less difficulty;in 6 weeks  Patient will have a decreased volume in : right;LE;by 5%;to decrease risk of infection;for better fit of clothing;for improved body image;for ease of movement;in 6 weeks  Patient will perform, verbalize self-management of: skin care;self-monitoring;exercise;massage;self-compression;compression wear;compression care;infection prevention;in 6 weeks    Patient's  expectations/goals are realistic.    Barriers to Learning or Achieving Goals:  No Barriers.    Lymphedema Category:  VI - Stage 2 with Mod-High Functional Demand       Plan / Patient Instructions:      Plan of Care:   Communication with: Referral Source  Patient Related Instruction: Nature of Condition;Treatment plan and rationale;Self Care instruction;Basis of treatment;Body mechanics;Posture;Precautions;Next steps;Expected outcome  Times per Week: 2-3  Number of Weeks: 4-6  Number of Visits: 12  Discharge Planning: when indicated and goals met  Precautions / Restrictions : none  Therapeutic Exercise: ROM;Stretching;Strengthening;Lymphedema  Manual Therapy: soft tissue mobilization;joint mobilization;lymphatic drainage massage  Functional Training (ADL's): skin care;self care;lymphedema precautions;bandage/garment wear and care;ADL's;ergonomics;instructions for equipment  Equipment: compression bandages    Plan for next visit: Assess response to lymphedema HEP x5 reps each exercise.  Pt to look for bandages at home and bring with if she can find them.  Initiate MLD for R LE and R knee ROM and strength for TKA, scar mobs for incision.    Treatment techniques, plan of care, and goals were discussed with the patient.  The patient agrees to the plan as outlined.  The plan of care is dynamic and will be modified on an ongoing basis.       Subjective:         Social information:   Occupation:retired   Work Status:NA    History of Present Illness:  Pt reports having lymphedema in her L leg for 20+ years.  L TKA 1/4/16 and pt came for lymphedema treatment at that time and did well.  Pt since has had R TKA 3/6/17 and started noticing swelling about a month later.  Pt reports pain from swelling and difficulty in R TKA rehab due to swelling.    Surgery: B TKA, gallbladder removal and appendix removal (incision), 2 hernias without surgery, hysterectomy 1979    Treatments: PT  Precautions/Restrictions: None    Pain  Ratin  Pain rating at best: 3  Pain rating at worst: 8  Pain description: tightness from swelling    Functional limitations are described as occurring with:   kneeling/squatting  ascending and descending stairs or curbs  walking uneven surfaces    Patient reports benefit from:  massage and stockings       Objective:      Patient Outcome Measures :    Lymphedema Life Impact Score (%): 0.38   Scores range from %, where a score of 20% represents the least impact on the individual's life (minimal physical, psychosocial and functional concerns).     Precautions/Restrictions: None  Involved side: Bilateral  Posture Observation:      General sitting posture is  fair.  Involved area: Bilateral Leg  Edema: Pitting at grade, 2+ and 3+ R upper to distal shin and calf  Induration: Yes  Fibrosis: moderate  Temperature: Normal  Sensation: Normal except around R TKA medial side decreased sensation  Skin color: Reddened B anterior distal shin  Skin texture: Dry and pt has multiple red, scab like areas - not sure if they are just from dry skin or fungal infection  Scars: Location: B knee  Size: TKA vertical incision  Wounds: Absent  Muscle tone: Normal  ROM: R knee flexion 99   with tightness and pain, L knee flex 98     Strength: B LE grossly 4+/5    Gait: Amb with significant trendelenberg R > L with R LE mostly straight through knee    Tests:  Volumes measured, positive Stemmer's B    Date 617    Side right left   Toe 7.8 8.2   10 cm from tip of second toe 24.5 24   20 cm from tip of second toe 28.5 28   30 cm from tip of second toe 34.5 36.2   40 cm from tip of second toe 45.3 43.7    50 cm from tip of second toe 47.2 49   60 cm from tip of second toe 58.6 65.5   70 cm from tip of second toe 64.5 73   Total estimated volume 9581.943151 [id number]        Swelling Left>Right 12.6425226   % Change of volume from last visit    % Change of volume from initial visit     Note a negative % change indicates a decrease in  volume       Treatment Today      TREATMENT MINUTES COMMENTS   Evaluation 26 R LE lymphedema   Self-care/ Home management 12 Discussed use of stockings and compression bandages for swelling. Discussed lymphedema POC and pt agreed. Pt given lymph pamphlet.   Manual therapy     Neuromuscular Re-education     Therapeutic Activity     Therapeutic Exercises 12 Discussed lymph HEP with written instructions given.   Gait training     Modality__________________                Total 50    Blank areas are intentional and mean the treatment did not include these items.     PT Evaluation Code: (Please list factors)  Patient History/Comorbidities: B TKA, multiple abdominal surgeries  Examination: R LE  Clinical Presentation: stable  Clinical Decision Making: low    Patient History/  Comorbidities Examination  (body structures and functions, activity limitations, and/or participation restrictions) Clinical Presentation Clinical Decision Making (Complexity)   No documented Comorbidities or personal factors 1-2 Elements Stable and/or uncomplicated Low   1-2 documented comorbidities or personal factor 3 Elements Evolving clinical presentation with changing characteristics Moderate   3-4 documented comorbidities or personal factors 4 or more Unstable and unpredictable High          Elle Blum PT, DPT, OCS, CLT  6/14/2017  8:08 AM

## 2021-06-11 NOTE — PROGRESS NOTES
[facility] Daily Progress     Patient Name: Vaishali Bernal  Date: 2017  Visit #: 2  PTA visit #:  -  Referral Diagnosis: right lower extremity lymphedema  Referring provider: Yung Ordonez MD  Visit Diagnosis:     ICD-10-CM    1. Lymphedema I89.0          Assessment:     Patient relaxes well with MLD. Provided a set of bandages supplies for right leg.    Patient demonstrates understanding/independence with home program.  Patient is benefitting from skilled physical therapy and is making steady progress toward functional goals.  Patient is appropriate to continue with skilled physical therapy intervention, as indicated by initial plan of care.    Goal Status:  Pt. will demonstrate/verbalize independence in self-management of condition in : 6 weeks;Progressing toward  Pt. will be able to walk : 10 minutes;20 minutes;around the house;6 blocks;with less pain;with less difficulty;for household mobility;for community mobility;for exercise/recreation;in 6 weeks;Progressing toward  Patient will ascend / descend: stairs;independently;with less pain;with less difficulty;in 6 weeks;Progressing toward  Patient will have a decreased volume in : right;LE;by 5%;to decrease risk of infection;for better fit of clothing;for improved body image;for ease of movement;in 6 weeks;Progressing toward  Patient will perform, verbalize self-management of: skin care;self-monitoring;exercise;massage;self-compression;compression wear;compression care;infection prevention;in 6 weeks;Progressing toward    Plan / Patient Education:     Continue with initial plan of care.  Progress with home program as tolerated. Continue with manual lymph drainage, medical compression bandages, exercises, skin care, and patient education.Coordinate compression garment fitting.      Subjective:     Pain Ratin  Right knee      Objective:     Caregiver present: No    Observation of swelling: right lower extremity is unchanged.     Volume  measurements taken:  No      Skin condition is:  unchanged    Compression:  No compression.    Medication for infection:  No    Treatment Today     Patient signed ABN for the bandaging supplies:yes.   Provided 03 rolls of comprilan, 01 rolls of artiflex. 01 set per limb.  New compression garment: has compression stockings.    TREATMENT MINUTES COMMENTS   Evaluation     Self-care/ Home management 30 Medical compression bandaging to right lower extremity from forefoot to below the knee: Applied  Eucerin skin lotion, stockinet, ,artiflex 01 roll, comprilan 03 rolls.Used a small piece of Tubigrip on top to protect bandages.  Instructed to remove them if uncomfortable.     Manual therapy 30 Manual therapy: manual lymph drainage for right lower extremity lymphedema  Deep abdominal breath, neck effleurage, short neck, shoulder collectors, bilateral axilla, bilateral inguinal, anterior inguinal to axilla anastomosis on right side, right lower extremity evacuation, abdomen, neck effleurage.     Neuromuscular Re-education     Therapeutic Activity     Therapeutic Exercises  Instructed to continue with her lymphedema exercises   Gait training     Modality__________________                Total 60    Blank areas are intentional and mean the treatment did not include these items.       Hamida Knight PT, JAILYN-EARLINE  6/29/2017

## 2021-06-11 NOTE — PROGRESS NOTES
[facility] Daily Progress     Patient Name: Vaishali Bernal  Date: 2017  Visit #: 4  PTA visit #:  1  Referral Diagnosis: right lower extremity lymphedema  Referring provider: Yung Ordonez MD  Visit Diagnosis:     ICD-10-CM    1. Lymphedema I89.0    2. Status post total right knee replacement Z96.651    3. Edema R60.9          Assessment:     Significant reduction in R LE edema, both lower and upper leg. Knee had wrinkling evident after MT    Patient demonstrates understanding/independence with home program.  Patient is benefitting from skilled physical therapy and is making steady progress toward functional goals.  Patient is appropriate to continue with skilled physical therapy intervention, as indicated by initial plan of care.    Goal Status:  Pt. will demonstrate/verbalize independence in self-management of condition in : 6 weeks;Progressing toward  Pt. will be able to walk : 10 minutes;20 minutes;around the house;6 blocks;with less pain;with less difficulty;for household mobility;for community mobility;for exercise/recreation;in 6 weeks;Progressing toward  Patient will ascend / descend: stairs;independently;with less pain;with less difficulty;in 6 weeks;Progressing toward  Patient will have a decreased volume in : right;LE;by 5%;to decrease risk of infection;for better fit of clothing;for improved body image;for ease of movement;in 6 weeks;Progressing toward  Patient will perform, verbalize self-management of: skin care;self-monitoring;exercise;massage;self-compression;compression wear;compression care;infection prevention;in 6 weeks;Progressing toward    Plan / Patient Education:     Continue with initial plan of care.  Progress with home program as tolerated. Continue with manual lymph drainage, medical compression bandages, exercises, skin care, and patient education.Coordinate compression garment fitting.      Subjective:     Pain Ratin  Bandaging removed after 32 hours, due to  "slippage, really warm      Objective:     Caregiver present: No    Observation of swelling: R LE appears near normal to pt, appears smaller than L, including thigh    Volume measurements taken:  No      Skin condition is:  Intact.  Rash unchanged per pt    Compression:None on  Knee high compression stockings.    Medication for infection:  No    New compression garment: has compression stockings, both knee high and thigh high, both about a year old      Treatment today:  TREATMENT MINUTES COMMENTS   Evaluation     Self-care/ Home management 30 Medical compression bandaging to right lower extremity from forefoot to thigh: Applied  Eucerin skin lotion, stockinet, ,artiflex 01 roll on low leg, comprilan 03 rolls..Added from the knee to upper thigh: stockinete size \"J\", 1 roll of Rosidal,1 roll of isoband for upper thigh  And 1 comprilan. Compression tube J over knee to thigh and Comperm G over foot and low leg.  Instructed to remove them if uncomfortable, or if skin becomes wet or itchy     Manual therapy 25 Manual therapy: manual lymph drainage for right lower extremity lymphedema  Deep abdominal breath,neck, trunk and R LE evacuation   Neuromuscular Re-education     Therapeutic Activity     Therapeutic Exercises  Instructed to continue with her lymphedema exercises   Gait training     Modality__________________                Total 55    Blank areas are intentional and mean the treatment did not include these items.       Vicki Stevens PTA,CLT  7/5/2017    "

## 2021-06-11 NOTE — PROGRESS NOTES
"[facility] Daily Progress     Patient Name: Vaishali Bernal  Date: 7/10/2017  Visit #:   PTA visit #:  2  Referral Diagnosis: right lower extremity lymphedema  Referring provider: Yung Ordonez MD  Visit Diagnosis:     ICD-10-CM    1. Lymphedema I89.0    2. Status post total right knee replacement Z96.651          Assessment:     Circumferential measurement show decreased on right leg.  Patient is compliant with home program.    Patient demonstrates understanding/independence with home program.  Patient is benefitting from skilled physical therapy and is making steady progress toward functional goals.  Patient is appropriate to continue with skilled physical therapy intervention, as indicated by initial plan of care.    Goal Status:  Pt. will demonstrate/verbalize independence in self-management of condition in : 6 weeks;Progressing toward  Pt. will be able to walk : 10 minutes;20 minutes;around the house;6 blocks;with less pain;with less difficulty;for household mobility;for community mobility;for exercise/recreation;in 6 weeks;Progressing toward  Patient will ascend / descend: stairs;independently;with less pain;with less difficulty;in 6 weeks;Progressing toward  Patient will have a decreased volume in : right;LE;by 5%;to decrease risk of infection;for better fit of clothing;for improved body image;for ease of movement;in 6 weeks;Progressing toward  Patient will perform, verbalize self-management of: skin care;self-monitoring;exercise;massage;self-compression;compression wear;compression care;infection prevention;in 6 weeks;Progressing toward    Plan / Patient Education:     Continue with initial plan of care.  Progress with home program as tolerated. Continue with manual lymph drainage, medical compression bandages, exercises, skin care, and patient education.Coordinate compression garment fitting.  Instructed to wear her compression brianne.      Subjective:     Pain Ratin  \"feels leg is " "better\".      Objective:     Caregiver present: No    Observation of swelling: R LE smaller.    Volume measurements taken:  yes    Date 6.14.17   7-10-17    Side right left Right     Toe 7.8 8.2 8.2     10 cm from tip of second toe 24.5 24 24.4     20 cm from tip of second toe 28.5 28 26.6     30 cm from tip of second toe 34.5 36.2 30.5     40 cm from tip of second toe 45.3 43.7 43      50 cm from tip of second toe 47.2 49 46     60 cm from tip of second toe 58.6 65.5 57.7     70 cm from tip of second toe 64.5 73 63.2     Total estimated volume [id number] 97652.11130 8878.68               Swelling Left>Right 12.1503653      % Change of volume from last visit   -9.87%    % Change of volume from initial visit        Note a negative % change indicates a decrease in volume              Skin condition is:  Intact.  Rash unchanged per pt    Compression:None on.  Took bandages off yesterday, wore the stockings yesterday.    Medication for infection:  No    New compression garment: has compression stockings, both knee high and thigh high, both about a year old      Treatment today:  TREATMENT MINUTES COMMENTS   Evaluation     Self-care/ Home management 25 Medical compression bandaging to right lower extremity from forefoot to above  knee: Applied  Eucerin skin lotion, stockinet, ,rosidal soft 01 roll on ankle to below the knee low leg,isoband to knee,  comprilan 03 rolls., Comperm G over all.  Instructed to remove them if uncomfortable, or if skin becomes wet or itchy     Manual therapy 30 Manual therapy: manual lymph drainage for right lower extremity lymphedema  Deep abdominal breath,neck, trunk and R LE evacuation   Neuromuscular Re-education     Therapeutic Activity     Therapeutic Exercises 5 Instructed to continue with her lymphedema exercises   Gait training     Modality__________________                Total 60    Blank areas are intentional and mean the treatment did not include these items.       Hamida BRASHER" Antonia SAUNDERS,JAILYN-EARLINE  7/10/2017

## 2021-06-12 NOTE — PROGRESS NOTES
[facility]   Arrhythmia Clinic    Assessment/Plan:  Diagnoses and all orders for this visit:    Paroxysmal atrial fibrillation and no A. fib since I saw her in May.  She appeared clearly symptomatic at the time.  Again today discussed natural progression and waiting to see what hers will be since new diagnosis.  [id number] score of  2 and on warfarin and manageable for her.  To call if more frequent episodes of atrial fib.  To follow-up with me in 6 months.    SVT (supraventricular tachycardia) and less episodes over the last few years.    Peripheral edema and has lymphedema.  She is going in for wraps next week.  She reports that her fluid retention is worse.  She is on high-dose diltiazem and may be contributing.  To stop diltiazem 120 mg p.o. daily.  To increase furosemide from 20-40 mg p.o. daily for 1 week and then decrease back to 20 mg p.o. daily.  To proceed with wrapping for lymphedema as planned starting next week.  -     Discontinue: furosemide (LASIX) 20 MG tablet; Take 2 tablets (40 mg total) by mouth daily. For 1 week and then call for instructions.  Dispense: 60 tablet; Refill: 0  -     furosemide (LASIX) 20 MG tablet; Take 2 tablets (40 mg total) by mouth daily. For 1 week and then decrease back to 1 tab a day.  Dispense: 60 tablet; Refill: 0    ______________________________________________________________________    Subjective:    I had the opportunity to see Vaishali Bernal at the [facility]. Vaishali Bernal is a 71 y.o. female and here for EP follow-up regarding paroxysmal atrial fib and new diagnosis in April 2017.  She was clearly symptomatic with her A. fib.    Vaishali Bernal has a known history of SVT, lymphedema and CRAIG.  She has shortness of breath with walking longer distances and encouraged her to be more active.  She has had a knee replacement within the last year.  Other than shortness of breath with exertion she denies any cardiac symptoms.  No symptoms of  recurrence of A. fib since initial hospitalization in April 2017.    ______________________________________________________________________    Problem List:  Patient Active Problem List   Diagnosis     Osteoarthritis of left knee     Osteoarthritis of right knee     Paroxysmal atrial fibrillation     SVT (supraventricular tachycardia)     Nonalcoholic steatohepatitis (CRAIG)     Medical History:  Past Medical History:   Diagnosis Date     Arthritis      Asthma      Back pain      Hypertension      Lumbar stenosis      SVT (supraventricular tachycardia)      Surgical History:  Past Surgical History:   Procedure Laterality Date     APPENDECTOMY       CHOLECYSTECTOMY       HERNIA REPAIR       HYSTERECTOMY  1977     JOINT REPLACEMENT      knee     knee meniscus repair Left      OOPHORECTOMY Bilateral 1974     OK TOTAL KNEE ARTHROPLASTY Left 1/4/2016    Procedure: LEFT KNEE TOTAL ARTHROPLASTY;  Surgeon: Ash Campa MD;  Location: [facility];  Service: Orthopedics     OK TOTAL KNEE ARTHROPLASTY Right 3/6/2017    Procedure: #3  RIGHT TOTAL KNEE ARTHROPLASTY;  Surgeon: Ash Campa MD;  Location: [facility];  Service: Orthopedics     TONSILLECTOMY       Social History:  Social History   Substance Use Topics     Smoking status: Never Smoker     Smokeless tobacco: None     Alcohol use No        Review of Systems: Review of Systems:   General: WNL  Eyes: WNL  Ears/Nose/Throat: WNL  Lungs: Cough  Heart: WNL  Stomach: WNL  Bladder: WNL  Muscle/Joints: WNL  Skin: WNL  Nervous System: WNL  Mental Health: WNL     Blood: WNL      Family History:  Family History   Problem Relation Age of Onset     Breast cancer Maternal Aunt 85     Breast cancer Cousin      Anesthesia problems Neg Hx          Allergies:  Allergies   Allergen Reactions     Aspirin Other (See Comments)     Nose bleeds when taking large amount-many years ago, and dry hands. Tolerated ASA when here for L knee last January     Iodine Hives     Levaquin  [Levofloxacin] Other (See Comments)     Tendon pain     Sulfa (Sulfonamide Antibiotics) Unknown     From clinic list     Ceftin [Cefuroxime Axetil] Rash     Patient did have cefazolin 1/2016 without reaction with last knee surgery     Naproxen Rash     Nose bleeds and skin peels from palms and head       Novocain [Procaine] Hives and Rash     As a child     Medications:  Current Outpatient Prescriptions   Medication Sig Dispense Refill     acetaminophen (TYLENOL) 500 MG tablet Take 1,000 mg by mouth 3 (three) times a day as needed for pain.       albuterol (PROVENTIL) 2.5 mg /3 mL (0.083 %) nebulizer solution Take 2.5 mg by nebulization every 6 (six) hours as needed for wheezing.       cetirizine (ZYRTEC) 10 MG tablet Take 10 mg by mouth daily.       cholecalciferol, vitamin D3, 1,000 unit tablet Take 1,000 Units by mouth daily.       diltiazem (CARDIZEM CD) 240 MG 24 hr capsule Take 240 mg by mouth daily. (take with 120mg cap = 360mg total)       fluocinonide (LIDEX) 0.05 % external solution Apply 1 application topically bedtime as needed.       fluticasone (FLONASE) 50 mcg/actuation nasal spray Apply 2 sprays into each nostril daily.       furosemide (LASIX) 20 MG tablet Take 2 tablets (40 mg total) by mouth daily. For 1 week and then decrease back to 1 tab a day. 60 tablet 0     ketotifen (ZADITOR/ZYRTEC ITCHY EYES) 0.025 % (0.035 %) ophthalmic solution Administer 1 drop to both eyes 2 (two) times a day as needed.       loratadine (CLARITIN) 10 mg tablet Take 10 mg by mouth daily.       losartan (COZAAR) 100 MG tablet Take 100 mg by mouth daily.       MULTIVITAMIN ORAL Take 1 tablet by mouth daily.       omeprazole (PRILOSEC) 20 MG capsule Take 20 mg by mouth Daily before breakfast.        potassium chloride (KLOR-CON) 10 MEQ CR tablet Take 10 mEq by mouth daily.       warfarin (COUMADIN) 4 MG tablet Take 4 mg daily. Check INR on 4/17/17, Monday.  Adjust dose based on INR results as directed. 60 tablet 0      "albuterol (PROVENTIL HFA;VENTOLIN HFA) 90 mcg/actuation inhaler Inhale 2 puffs every 6 (six) hours as needed for wheezing.       No current facility-administered medications for this visit.        Objective:   Vital signs:  /64 (Patient Site: Right Arm, Patient Position: Sitting, Cuff Size: Adult Large)  Pulse 76  Resp 16  Ht 5' 3\" (1.6 m)  Wt (!) 301 lb (136.5 kg)  LMP 09/18/1977  BMI 53.32 kg/m2      Physical Exam:    GENERAL APPEARANCE: Alert, cooperative and in no acute distress.  HEENT: No scleral icterus. No Xanthelasma. Oral mucuos membranes pink and moist.  NECK: JVP Nl.   CHEST: clear to auscultation  CARDIOVASCULAR: S1, S2 without murmur ,clicks or rubs. Regular, regular.  Radial and posterior tibial pulses are intact and symetric.   EXTREMITIES: No cyanosis, clubbing .  3-4+ lower extremity edema with redness in bilateral shins and some bubbling of left shin.    Results personally reviewed:  Results for orders placed during the hospital encounter of 04/12/17   Echo Complete [ECH10] 04/13/2017    Narrative   No previous study for comparison.    No hemodynamically significant valvular heart abnormalities.    Left ventricle ejection fraction is normal. The estimated left   ventricular ejection fraction is 70%.        2002 nuclear stress test negative.     Results for orders placed or performed in visit on 05/05/17   ECG Clinic - Today   Result Value Ref Range    SYSTOLIC BLOOD PRESSURE  mmHg    DIASTOLIC BLOOD PRESSURE  mmHg    VENTRICULAR RATE 89 BPM    ATRIAL RATE 89 BPM    P-R INTERVAL 170 ms    QRS DURATION 78 ms    Q-T INTERVAL 350 ms    QTC CALCULATION (BEZET) 425 ms    P Axis 52 degrees    R AXIS -16 degrees    T AXIS 23 degrees    MUSE DIAGNOSIS       Normal sinus rhythm  Normal ECG  When compared with ECG of 12-APR-2017 22:59,  Sinus rhythm has replaced Atrial fibrillation  Confirmed by LUNAA DOWNEY, LES LOC: ([id number]) on 5/8/2017 5:12:38 PM         TSH:   Lab Results   Component Value " Date    TSH 1.17 04/14/2017     BNP: No results found for: BNP  BMP:  Lab Results   Component Value Date    CREATININE 0.81 05/05/2017    BUN 10 05/05/2017     05/05/2017    K 4.2 05/05/2017     05/05/2017    CO2 24 05/05/2017       This note has been dictated using voice recognition software. Any grammatical or context distortions are unintentional and inherent to the software.    KYM HILL RN, [facility]  [phone]

## 2021-06-13 NOTE — PROGRESS NOTES
[facility] Discharge Summary    Patient Name: Vaishali Bernal  Date: 9/15/2017  Referring provider: Yung Ordonez MD  Visit Diagnosis:     ICD-10-CM    1. Lymphedema I89.0    2. Status post total right knee replacement Z96.651        Goal Status:  See status in note below.    Patient was seen for 6 visits from 6/14/17 to 7/10/17 with 0 missed appointments.    The patient attended therapy initially, but did not finish the therapy sessions prescribed as pt did not return for f/u.  Pt cancelled her remaining scheduled appointments.    Therapy will be discontinued at this time.  The patient will need a new referral to resume.    Thank you for your referral.  Elle Blum PT, DPT, OCS, CLT  9/15/2017  9:30 AM

## 2021-06-15 PROBLEM — M17.11 OSTEOARTHRITIS OF RIGHT KNEE: Status: ACTIVE | Noted: 2017-03-06

## 2021-06-15 PROBLEM — I48.19 PERSISTENT ATRIAL FIBRILLATION (H): Status: ACTIVE | Noted: 2017-04-13

## 2021-06-15 PROBLEM — I47.10 SVT (SUPRAVENTRICULAR TACHYCARDIA) (H): Status: ACTIVE | Noted: 2017-05-05

## 2021-06-15 PROBLEM — K75.81 NONALCOHOLIC STEATOHEPATITIS (NASH): Status: ACTIVE | Noted: 2017-05-05

## 2021-06-17 NOTE — PROGRESS NOTES
[facility]   Arrhythmia Clinic    Assessment/Plan:  Diagnoses and all orders for this visit:    Paroxysmal atrial fibrillation and no reoccurrence since April 2017.  [id number] score of 2 and on chronic warfarin.  I believe she is directly symptomatic with atrial fib.  She checks her pulse at least once a day and whenever she does not feel good and always regular danyel.  On diltiazem 240 mg p.o. daily and peripheral edema improved when I decreased this from 360 mg.  To continue 240 mg p.o. Daily.    Due to direct symptoms with atrial fib could use pill in the pocket.  Discussed because she is going so long between episodes may have longer episodes when she goes out of rhythm.  She has stable INR so doing INR 6 weeks apart at times.  Discussed 48 hour limit if INR is are further apart or subtherapeutic if future A. fib episode over the weekend.  If she is doing INRs monthly could see me in clinic urgently in order cardioversion rather than using ER or urgent care.  Discussed pill in the pocket as alternative plan, but would need nuclear stress test before I could prescribe this medication.  At this point,she wants to wait and see what her progression will be.  To follow-up with me in 1 year or sooner if needed.  To call if symptoms of reoccurrence of A. fib.    SVT (supraventricular tachycardia) with rare episodes.    Peripheral edema well-controlled on low-dose diuretic and wearing compression stockings daily.  -     furosemide (LASIX) 20 MG tablet; Take 1 tablet (20 mg total) by mouth daily. Take 1 tablet by mouth daily    Other orders  -     pravastatin (PRAVACHOL) 20 MG tablet; Take 20 mg by mouth daily.    ______________________________________________________________________    Subjective:    I had the opportunity to see Vaishali BRASHER Mary Kaypa at the [facility]. Vaishali Bernal is a 72 y.o. female and here for yearly EP follow-up secondary to new diagnosis of paroxysmal atrial fib in April  2017.  Vaishali Bernal has a known history of SVT, lymphedema and Craig.  She reports no change in energy level or tolerance of activity over the last year.  She has low energy level but describes being socially very active.  She is currently hosting lots of people in the next week from out of town for example.  She denies other health issues.  She denies other cardiac symptoms.  No reoccurrence of A. fib since original episode.    ______________________________________________________________________    Problem List:  Patient Active Problem List   Diagnosis     Osteoarthritis of left knee     Osteoarthritis of right knee     Paroxysmal atrial fibrillation     SVT (supraventricular tachycardia)     Nonalcoholic steatohepatitis (CRAIG)     Medical History:  Past Medical History:   Diagnosis Date     Arthritis      Asthma      Back pain      Hypertension      Lumbar stenosis      SVT (supraventricular tachycardia)      Surgical History:  Past Surgical History:   Procedure Laterality Date     APPENDECTOMY       CHOLECYSTECTOMY       HERNIA REPAIR       HYSTERECTOMY  1977     JOINT REPLACEMENT      knee     knee meniscus repair Left      OOPHORECTOMY Bilateral 1974     MN TOTAL KNEE ARTHROPLASTY Left 1/4/2016    Procedure: LEFT KNEE TOTAL ARTHROPLASTY;  Surgeon: Ash Campa MD;  Location: [facility] OR;  Service: Orthopedics     MN TOTAL KNEE ARTHROPLASTY Right 3/6/2017    Procedure: #3  RIGHT TOTAL KNEE ARTHROPLASTY;  Surgeon: Ash Campa MD;  Location: [facility];  Service: Orthopedics     TONSILLECTOMY       Social History:  Social History   Substance Use Topics     Smoking status: Never Smoker     Smokeless tobacco: Never Used     Alcohol use No        Review of Systems: Review of Systems:   General: WNL  Eyes: WNL  Ears/Nose/Throat: WNL  Lungs: WNL  Heart: Leg Swelling  Stomach: Heartburn  Bladder: WNL  Muscle/Joints: Joint Pain  Skin: WNL  Nervous System: WNL  Mental Health: WNL     Blood:  WNL      Family History:  Family History   Problem Relation Age of Onset     Breast cancer Maternal Aunt 85     Breast cancer Cousin      Anesthesia problems Neg Hx          Allergies:  Allergies   Allergen Reactions     Aspirin Other (See Comments)     Nose bleeds when taking large amount-many years ago, and dry hands. Tolerated ASA when here for L knee last January     Iodine Hives     Levaquin [Levofloxacin] Other (See Comments)     Tendon pain     Sulfa (Sulfonamide Antibiotics) Unknown     From clinic list     Atorvastatin Rash     Ceftin [Cefuroxime Axetil] Rash     Patient did have cefazolin 1/2016 without reaction with last knee surgery     Naproxen Rash     Nose bleeds and skin peels from palms and head       Novocain [Procaine] Hives and Rash     As a child     Medications:  Current Outpatient Prescriptions   Medication Sig Dispense Refill     acetaminophen (TYLENOL) 500 MG tablet Take 1,000 mg by mouth 3 (three) times a day as needed for pain.       albuterol (PROVENTIL HFA;VENTOLIN HFA) 90 mcg/actuation inhaler Inhale 2 puffs every 6 (six) hours as needed for wheezing.       albuterol (PROVENTIL) 2.5 mg /3 mL (0.083 %) nebulizer solution Take 2.5 mg by nebulization every 6 (six) hours as needed for wheezing.       cetirizine (ZYRTEC) 10 MG tablet Take 10 mg by mouth daily.       cholecalciferol, vitamin D3, 1,000 unit tablet Take 1,000 Units by mouth daily.       diltiazem (CARDIZEM CD) 240 MG 24 hr capsule Take 240 mg by mouth daily. (take with 120mg cap = 360mg total)       fluocinonide (LIDEX) 0.05 % external solution Apply 1 application topically bedtime as needed.       fluticasone (FLONASE) 50 mcg/actuation nasal spray Apply 2 sprays into each nostril daily as needed.        furosemide (LASIX) 20 MG tablet Take 1 tablet (20 mg total) by mouth daily. Take 1 tablet by mouth daily       ketotifen (ZADITOR/ZYRTEC ITCHY EYES) 0.025 % (0.035 %) ophthalmic solution Administer 1 drop to both eyes 2 (two)  "times a day as needed.       losartan (COZAAR) 100 MG tablet Take 100 mg by mouth daily.       MULTIVITAMIN ORAL Take 1 tablet by mouth daily.       omeprazole (PRILOSEC) 20 MG capsule Take 20 mg by mouth Daily before breakfast.        potassium chloride (KLOR-CON) 10 MEQ CR tablet Take 10 mEq by mouth daily.       pravastatin (PRAVACHOL) 20 MG tablet Take 20 mg by mouth daily.       warfarin (COUMADIN) 4 MG tablet Take 4 mg daily. Check INR on 4/17/17, Monday.  Adjust dose based on INR results as directed. 60 tablet 0     loratadine (CLARITIN) 10 mg tablet Take 10 mg by mouth daily.       No current facility-administered medications for this visit.        Objective:   Vital signs:  /64 (Patient Site: Left Arm, Patient Position: Sitting, Cuff Size: Adult Large)  Pulse 72  Resp 16  Ht 5' 3\" (1.6 m)  Wt (!) 308 lb (139.7 kg)  LMP 09/18/1977  BMI 54.56 kg/m2      Physical Exam:    GENERAL APPEARANCE: Alert, cooperative and in no acute distress.  HEENT: No scleral icterus. No Xanthelasma. Oral mucuos membranes pink and moist.  NECK: JVP Nl.   CHEST: clear to auscultation  CARDIOVASCULAR: S1, S2 without murmur ,clicks or rubs. Regular, regular.  Radial and posterior tibial pulses are intact and symetric.   EXTREMITIES: No cyanosis, clubbing .  Possibly 1+ edema and has compression stockings on bilaterally so difficult to assess.  Wearing routinely now.    Results personally reviewed:  Results for orders placed during the hospital encounter of 04/12/17   Echo Complete [ECH10] 04/13/2017    Narrative   No previous study for comparison.    No hemodynamically significant valvular heart abnormalities.    Left ventricle ejection fraction is normal. The estimated left   ventricular ejection fraction is 70%.            Results for orders placed or performed in visit on 05/05/17   ECG Clinic - Today   Result Value Ref Range    SYSTOLIC BLOOD PRESSURE  mmHg    DIASTOLIC BLOOD PRESSURE  mmHg    VENTRICULAR RATE 89 BPM "    ATRIAL RATE 89 BPM    P-R INTERVAL 170 ms    QRS DURATION 78 ms    Q-T INTERVAL 350 ms    QTC CALCULATION (BEZET) 425 ms    P Axis 52 degrees    R AXIS -16 degrees    T AXIS 23 degrees    MUSE DIAGNOSIS       Normal sinus rhythm  Normal ECG  When compared with ECG of 12-APR-2017 22:59,  Sinus rhythm has replaced Atrial fibrillation  Confirmed by LUANA DOWNEY, LES LOC: ([id number]) on 5/8/2017 5:12:38 PM         TSH:   Lab Results   Component Value Date    TSH 1.17 04/14/2017     BNP: No results found for: BNP  BMP:  Lab Results   Component Value Date    CREATININE 0.81 05/05/2017    BUN 10 05/05/2017     05/05/2017    K 4.2 05/05/2017     05/05/2017    CO2 24 05/05/2017       This note has been dictated using voice recognition software. Any grammatical or context distortions are unintentional and inherent to the software.    KYM HILL RN, [facility]ART [facility]  [phone]

## 2021-06-24 NOTE — TELEPHONE ENCOUNTER
Pt was seen by Jan 5/2018  Pt calling today to report she thinks she went in a fib on Sat  Pt states she has bad arthritis, recent flared up and PMD put her on Prednisone 20 mg for 5 days  Pt states on Sat just did not feel good,hx of SVT, pt states / 70 HR was 70-80 but irreg  Today pt states she is feeling much better less irreg  Pt is well hydrated  Reviewed with Shirley in Jan's absence  Pt is to monitor stay hydrated and call if sx increase  Pt is on Coumadin and Diltiazem no med change  Pt understands and agrees to plan and has my direct number if needed.

## 2021-06-29 NOTE — PROGRESS NOTES
Progress Notes by Adrianne Lee CNP at 2020  2:50 PM     Author: Adrianne Lee CNP Service: -- Author Type: Nurse Practitioner    Filed: 7/15/2020  9:41 AM Encounter Date: 2020 Status: Signed    : Adrianne Lee CNP (Nurse Practitioner)            Thank you, Dr. Ordonez, for asking the [facility] team to see Ms. Vaishali Bernal to evaluate persistent atrial fibrillation.    Assessment/Recommendations     Assessment/Plan:    Diagnoses and all orders for this visit:    Persistent atrial fibrillation (H) and does not appear to have good rate control as heart rate in the 90s at rest.  To increase diltiazem from 240 mg orally every day to 360 mg total a day.  To call if ankle swelling.  She tolerated this dose of diltiazem back in  and  without side effects.  I discussed rate versus rhythm control is decided on symptoms.  Vaishali has been under a lot of stress and making a lot of big decisions since her   in January of this year.  She denies any change in energy level or tolerance of activity.  She is surprised to hear that she is in persistent A. fib but deduct this from A. fib seen at preop H&P and again at time of cataract surgery last week and now again today in clinic.    I recommended echo within the next week and discuss possible cardioversion without medication change if she is not been out of rhythm for prolonged time.  If it appears from echo that she is probably been in atrial fib for a prolonged period of time will recommend antiarrhythmic start prior to cardioversion.  She also has the option to continue to follow rate control and then will recommend 24-hour Holter on current medications.    -     Echo Complete; Future; Expected date: 07/15/2020  -     Discontinue: diltiazem (CARDIZEM CD) 120 MG 24 hr capsule; Take 1 capsule (120 mg total) by mouth daily. In addition to 240 mg capsule.  Dispense: 30 capsule; Refill: 3  -     POCT  INR    Paroxysmal atrial fibrillation (H) history and now persistent.  Previously appeared symptomatic.    SVT (supraventricular tachycardia) (H) history.    Other orders  -     cephalexin (KEFLEX) 500 MG capsule; TK 1 C PO TID FOR 10 DAYS  -     predniSONE (DELTASONE) 2.5 MG tablet; CONTINUE 5MG D FOR THE NEXT 6 WEEKS THEN TRY DECREASING TO 2.5MG D IF TOLERATED  -     methotrexate 2.5 MG tablet; TAKE 10 TABLETS PO ONCE EVERY WEEK  -     folic acid (FOLVITE) 1 MG tablet; Take 1 mg by mouth.    [id number] score of 3 and on chronic warfarin and reports that INRs vary quite widely.  May 31 INR 1.5 and  INR 2.5.  Requests INR today and 4.8.  On warfarin 6 mg on  and 4 mg the other 4 of 7 days.  Cataract surgery next Thursday and need to decrease INR before this.  To hold warfarin for 3 days and then go back on warfarin 4 mg orally on  and .  Repeat INR on Monday,  in primary clinic.  To have a salad today and tomorrow to help lower her INR.  Follow up in clinic will be decided on echo results.     History of Present Illness/Subjective     Vaishali Bernal is a very pleasant 74 y.o. female who comes in today for EP urgent visit for afib.  Vaishali Bernal has a known history of diagnosis of paroxysmal atrial fib in 2017.  Vaishali Bernal has a known history of SVT, lymphedema and Johnson.  She reports no change in energy level or tolerance of activity over the last year.    Vaishali reports that her   in January of complications of pneumonia.  She was in [place] at the time.  She came back and moved in with her daughter.  She has sold her home of over 20 years.  She bought a [employer] close to her daughter and has now moved into that.  This has kept her super busy but she has not noticed that she has been out of breath or more tired.  She is having difficulty seeing and wants to proceed with cataract surgery.  She had cataract surgery last week and due for  her left eye this next Thursday.  She was in A. fib prior to her right  cataract surgery and tells me that she was also noted to be in A. fib on preop but physician did not discuss it with her.  She is not surprised to hear that she is in A. fib today but has no symptoms on questioning.  She denies any cardiac symptoms.  She called in in February 2019 with symptoms of an episode of A. fib but converted back to regular rhythm.  In the past I thought she was symptomatic with A. fib but unclear today that that still true.        Cardiographics (reviewed):  Results for orders placed during the hospital encounter of 04/12/17   Echo Complete [ECH10] 04/13/2017    Narrative   No previous study for comparison.    No hemodynamically significant valvular heart abnormalities.    Left ventricle ejection fraction is normal. The estimated left   ventricular ejection fraction is 70%.        Cardiac testing personally reviewed:     Results for orders placed or performed in visit on 05/05/17   ECG Clinic - Today   Result Value Ref Range    SYSTOLIC BLOOD PRESSURE  mmHg    DIASTOLIC BLOOD PRESSURE  mmHg    VENTRICULAR RATE 89 BPM    ATRIAL RATE 89 BPM    P-R INTERVAL 170 ms    QRS DURATION 78 ms    Q-T INTERVAL 350 ms    QTC CALCULATION (BEZET) 425 ms    P Axis 52 degrees    R AXIS -16 degrees    T AXIS 23 degrees    MUSE DIAGNOSIS       Normal sinus rhythm  Normal ECG  When compared with ECG of 12-APR-2017 22:59,  Sinus rhythm has replaced Atrial fibrillation  Confirmed by LUANA DOWNEY, LES LOC:SJ ([id number]) on 5/8/2017 5:12:38 PM            Problem List:  Patient Active Problem List   Diagnosis   ? Osteoarthritis of left knee   ? Osteoarthritis of right knee   ? Persistent atrial fibrillation (H)   ? SVT (supraventricular tachycardia) (H)   ? Nonalcoholic steatohepatitis (CRAIG)     Revi  e  Physical Examination Review of Systems   w [facility]  Vitals:    07/14/20 1455   BP: 108/64   Pulse: 84   Resp: 18   SpO2: 98%     Body mass index is  52.43 kg/m .  Wt Readings from Last 3 Encounters:   07/14/20 (!) 296 lb (134.3 kg)   05/07/18 (!) 308 lb (139.7 kg)   08/11/17 (!) 301 lb (136.5 kg)     General Appearance:   Alert, well-appearing and in no acute distress.   HEENT: Atraumatic, normocephalic.  No scleral icterus, normal conjunctivae; mucous membranes pink and moist.     Chest: Chest symmetric, spine straight.   Lungs:   Respirations unlabored: Lungs are clear to auscultation.   Cardiovascular:   Normal first and second heart sounds with no murmurs, rubs, or gallops.  Irregular, irregular and apical pulse 92.  Radial and posterior tibial pulses are intact.  Normal JVD, no edema.       Extremities: No cyanosis or clubbing   Musculoskeletal: Moves all extremities   Skin: Warm, dry, intact.    Neurologic: Mood and affect are appropriate, alert and oriented to person, place, time, and situation    General: WNL  Eyes: Visual Distubance  Ears/Nose/Throat: WNL  Lungs: WNL  Heart: Irregular Heartbeat  Stomach: Heartburn  Bladder: WNL  Muscle/Joints: Joint Pain  Skin: WNL  Nervous System: WNL  Mental Health: WNL     Blood: WNL       Medical History  Surgical History Family History Social History     Past Medical History:   Diagnosis Date   ? Arthritis    ? Asthma    ? Back pain    ? Hypertension    ? Lumbar stenosis    ? SVT (supraventricular tachycardia) (H)     Past Surgical History:   Procedure Laterality Date   ? APPENDECTOMY     ? CHOLECYSTECTOMY     ? HERNIA REPAIR     ? HYSTERECTOMY  1977   ? JOINT REPLACEMENT      knee   ? knee meniscus repair Left    ? OOPHORECTOMY Bilateral 1974   ? [place] TOTAL KNEE ARTHROPLASTY Left 1/4/2016    Procedure: LEFT KNEE TOTAL ARTHROPLASTY;  Surgeon: Ash Campa MD;  Location: [facility] OR;  Service: Orthopedics   ? [place] TOTAL KNEE ARTHROPLASTY Right 3/6/2017    Procedure: #3  RIGHT TOTAL KNEE ARTHROPLASTY;  Surgeon: Ash Campa MD;  Location: [facility] OR;  Service: Orthopedics   ? TONSILLECTOMY      Family  History   Problem Relation Age of Onset   ? Breast cancer Maternal Aunt 85   ? Breast cancer Cousin    ? Anesthesia problems Neg Hx     Social History     Tobacco Use   Smoking Status Never Smoker   Smokeless Tobacco Never Used     Social History     Substance and Sexual Activity   Alcohol Use No        Medications  Allergies     Current Outpatient Medications   Medication Sig Dispense Refill   ? acetaminophen (TYLENOL) 500 MG tablet Take 1,000 mg by mouth 3 (three) times a day as needed for pain.     ? albuterol (PROVENTIL HFA;VENTOLIN HFA) 90 mcg/actuation inhaler Inhale 2 puffs every 6 (six) hours as needed for wheezing.     ? cephalexin (KEFLEX) 500 MG capsule TK 1 C PO TID FOR 10 DAYS     ? cetirizine (ZYRTEC) 10 MG tablet Take 10 mg by mouth daily.     ? cholecalciferol, vitamin D3, 1,000 unit tablet Take 1,000 Units by mouth daily.     ? diltiazem (CARDIZEM CD) 240 MG 24 hr capsule Take 240 mg by mouth daily. (take with 120mg cap = 360mg total)     ? fluocinonide (LIDEX) 0.05 % external solution Apply 1 application topically bedtime as needed.     ? fluticasone (FLONASE) 50 mcg/actuation nasal spray Apply 2 sprays into each nostril daily as needed.      ? folic acid (FOLVITE) 1 MG tablet Take 1 mg by mouth.     ? furosemide (LASIX) 20 MG tablet Take 1 tablet (20 mg total) by mouth daily. Take 1 tablet by mouth daily     ? ketotifen (ZADITOR/ZYRTEC ITCHY EYES) 0.025 % (0.035 %) ophthalmic solution Administer 1 drop to both eyes 2 (two) times a day as needed.     ? loratadine (CLARITIN) 10 mg tablet Take 10 mg by mouth daily.     ? losartan (COZAAR) 100 MG tablet Take 100 mg by mouth daily.     ? methotrexate 2.5 MG tablet TAKE 10 TABLETS PO ONCE EVERY WEEK     ? MULTIVITAMIN ORAL Take 1 tablet by mouth daily.     ? omeprazole (PRILOSEC) 20 MG capsule Take 20 mg by mouth Daily before breakfast.      ? potassium chloride (KLOR-CON) 10 MEQ CR tablet Take 10 mEq by mouth daily.     ? predniSONE (DELTASONE) 2.5 MG  tablet CONTINUE 5MG D FOR THE NEXT 6 WEEKS THEN TRY DECREASING TO 2.5MG D IF TOLERATED     ? warfarin (COUMADIN) 4 MG tablet Take 4 mg daily. Check INR on 4/17/17, Monday.  Adjust dose based on INR results as directed. (Patient taking differently: Take 6 mg on Sun, Tue, and Thur and 4 mg the rest of the days. Adjust dose based on INR results as directed.) 60 tablet 0   ? diltiazem (CARDIZEM CD) 120 MG 24 hr capsule TAKE 1 CAPSULE(120 MG) BY MOUTH DAILY IN ADDITION  MG CAPSULE 90 capsule 0   ? pravastatin (PRAVACHOL) 20 MG tablet Take 20 mg by mouth daily.       No current facility-administered medications for this visit.       Allergies   Allergen Reactions   ? Aspirin Other (See Comments)     Nose bleeds when taking large amount-many years ago, and dry hands. Tolerated ASA when here for L knee last January   ? Doxycycline Other (See Comments)     Mouth sores when taken 3/2020  Mouth sores when taken 3/2020     ? Iodine Hives   ? Levaquin [Levofloxacin] Other (See Comments)     Tendon pain   ? Penicillins      Other reaction(s): *Unknown   ? Sulfa (Sulfonamide Antibiotics) Unknown     From clinic list   ? Tositumomab Iodine-131 Hives   ? Atorvastatin Rash   ? Ceftin [Cefuroxime Axetil] Rash     Patient did have cefazolin 1/2016 without reaction with last knee surgery   ? Naproxen Rash     Nose bleeds and skin peels from palms and head     ? Novocain [Procaine] Hives and Rash     As a child      Medical, surgical, family, social history, and medications were all reviewed and updated as necessary.   Lab Results    Chemistry/lipid CBC Cardiac Enzymes/BNP/TSH/INR     Lab Results   Component Value Date    CREATININE 0.81 05/05/2017    BUN 10 05/05/2017     05/05/2017    K 4.2 05/05/2017     05/05/2017    CO2 24 05/05/2017     Creatinine (mg/dL)   Date Value   05/05/2017 0.81   04/13/2017 1.16 (H)   04/12/2017 1.25 (H)   03/07/2017 0.82     No results found for: BNP Lab Results   Component Value Date     WBC 8.2 04/12/2017    HGB 12.7 04/12/2017    HCT 40.4 04/12/2017    MCV 80 04/12/2017     04/12/2017     Lab Results   Component Value Date    INR 1.09 04/12/2017      No results found for: CHOL, HDL, LDLCALC, TRIG       Total Time- 45 minutes with greater than 50% spent talking to patient and family regarding patient's relevant diagnoses.  This note has been dictated using voice recognition software. Any grammatical, typographical, or context distortions are unintentional and inherent to the software.

## 2021-07-03 NOTE — ADDENDUM NOTE
Addendum Note by Cheryle Ramírez MD at 3/30/2017 10:47 AM     Author: Cheryle Ramírez MD Service: -- Author Type: Physician    Filed: 3/30/2017 10:47 AM Date of Service: 3/30/2017 10:47 AM Status: Signed    : Cheryle Ramírez MD (Physician)       Addendum  created 03/30/17 1047 by Cheryle Ramírez MD    Anesthesia Intra Blocks edited, Sign clinical note

## 2021-07-13 ENCOUNTER — RECORDS - HEALTHEAST (OUTPATIENT)
Dept: ADMINISTRATIVE | Facility: CLINIC | Age: 76
End: 2021-07-13

## 2021-07-14 PROBLEM — N17.9 AKI (ACUTE KIDNEY INJURY) (H): Status: RESOLVED | Noted: 2017-04-13 | Resolved: 2017-08-11

## 2021-07-21 ENCOUNTER — RECORDS - HEALTHEAST (OUTPATIENT)
Dept: ADMINISTRATIVE | Facility: CLINIC | Age: 76
End: 2021-07-21

## 2021-07-22 ENCOUNTER — RECORDS - HEALTHEAST (OUTPATIENT)
Dept: SCHEDULING | Facility: CLINIC | Age: 76
End: 2021-07-22

## 2021-07-22 DIAGNOSIS — Z12.31 OTHER SCREENING MAMMOGRAM: ICD-10-CM

## 2021-09-19 ENCOUNTER — HEALTH MAINTENANCE LETTER (OUTPATIENT)
Age: 76
End: 2021-09-19

## 2022-06-17 ENCOUNTER — ANCILLARY PROCEDURE (OUTPATIENT)
Dept: MAMMOGRAPHY | Facility: CLINIC | Age: 77
End: 2022-06-17
Attending: FAMILY MEDICINE
Payer: COMMERCIAL

## 2022-06-17 DIAGNOSIS — Z12.31 VISIT FOR SCREENING MAMMOGRAM: ICD-10-CM

## 2022-06-17 PROCEDURE — 77067 SCR MAMMO BI INCL CAD: CPT

## 2022-06-26 ENCOUNTER — HEALTH MAINTENANCE LETTER (OUTPATIENT)
Age: 77
End: 2022-06-26

## 2022-07-05 ENCOUNTER — OFFICE VISIT (OUTPATIENT)
Dept: CARDIOLOGY | Facility: CLINIC | Age: 77
End: 2022-07-05
Payer: COMMERCIAL

## 2022-07-05 VITALS
SYSTOLIC BLOOD PRESSURE: 108 MMHG | HEART RATE: 96 BPM | BODY MASS INDEX: 48.18 KG/M2 | RESPIRATION RATE: 16 BRPM | DIASTOLIC BLOOD PRESSURE: 66 MMHG | WEIGHT: 272 LBS

## 2022-07-05 DIAGNOSIS — I47.10 SVT (SUPRAVENTRICULAR TACHYCARDIA) (H): ICD-10-CM

## 2022-07-05 DIAGNOSIS — E78.5 HYPERLIPIDEMIA LDL GOAL <100: ICD-10-CM

## 2022-07-05 DIAGNOSIS — G47.00 INSOMNIA, UNSPECIFIED TYPE: ICD-10-CM

## 2022-07-05 DIAGNOSIS — I48.21 PERMANENT ATRIAL FIBRILLATION (H): Primary | ICD-10-CM

## 2022-07-05 DIAGNOSIS — I48.19 PERSISTENT ATRIAL FIBRILLATION (H): ICD-10-CM

## 2022-07-05 DIAGNOSIS — E66.01 MORBID OBESITY (H): ICD-10-CM

## 2022-07-05 PROCEDURE — 99215 OFFICE O/P EST HI 40 MIN: CPT | Performed by: NURSE PRACTITIONER

## 2022-07-05 RX ORDER — FOLIC ACID 1 MG/1
1000 TABLET ORAL DAILY
COMMUNITY
Start: 2022-04-15

## 2022-07-05 RX ORDER — ROSUVASTATIN CALCIUM 5 MG/1
5 TABLET, COATED ORAL DAILY
Qty: 90 TABLET | Refills: 3 | Status: SHIPPED | OUTPATIENT
Start: 2022-07-05

## 2022-07-05 RX ORDER — ROSUVASTATIN CALCIUM 5 MG/1
2.5 TABLET, COATED ORAL DAILY
COMMUNITY
Start: 2022-03-22 | End: 2022-07-05

## 2022-07-05 NOTE — LETTER
7/5/2022    Yung Ordonez  [facility] [address] [id number]    RE: Vaishali Bernal       Dear Colleague,     I had the pleasure of seeing Vaishali BRASHER Dolores in the [facility].    Thank you, Dr. Ordonez for asking the [facility] team to see Ms. Vaishali Bernal to evaluate persistent- permanent atrial fibrillation.    Assessment/Recommendations     Assessment/Plan:    Diagnoses and all orders for this visit:  Persistent atrial fibrillation (H) and now likely permanent atrial fibrillation.  Notices irregular heartbeat occasionally when short of breath with activity but otherwise asymptomatic now with atrial fibrillation and patient agrees.  She is on high-dose diltiazem of 360 mg p.o. daily, which may be contributing some to peripheral edema but she has low energy level and she is reluctant to switch some heart rate control medication to beta-blocker.  Recommended 24-hour Holter to evaluate heart rates daytime and nighttime.  She walks around her home about once an hour during the daytime for activity.  She does not go out very often except for social activities.  Encouraged use of compression stockings when she is home for peripheral edema/lymphedema.  Will send holter results thru my chart.  SVT (supraventricular tachycardia) (H) history.  Hyperlipidemia with family history of CAD and recommend LDL less than 100.  No muscle aching on a Lily statin 2.5 mg orally every day.  Increase to 5 mg every day.  Had muscle aching with several other statins in the past.  If she develops muscle aching to try to add coenzyme Q 10 100 mg p.o. daily to see if this takes care of muscle aching.  Insomnia and recommended use of melatonin 5 mg orally every evening for 3 weeks before she decides if this can help.  To use blue screen on reading device or switch to audiobooks.  to discuss with Dr. Ordonez as needs as needed sleeping pill.  [id number] score of 3  and on warfarin.  I recommend fingersticks for INRs to prevent scarring at venipuncture sites.  Recently INRs have been quite variable but previously had been stable.  Follow up in clinic with me as needed.  Dr. Ordonez to refer back to me if she believes that watchman device would be appropriate and helpful if Vaishali continues to have difficulty controlling INRs.     History of Present Illness/Subjective     Vaishali Bernal is a very pleasant 76 year old female who comes in today for EP follow-up for persistent atrial fibrillation.  Vaishali Bernal has a known history of paroxysmal atrial fib in April 2017 and noted to be in persistent A. fib in 2020 and asymptomatic.  Vaishali Bernal has a known history of SVT, lymphedema and Craig.  She reports no change in energy level or tolerance of activity over the last year.  She is not very active due to spinal stenosis and arthritis.  She is on methotrexate.  She uses a walker when she goes out for activities.  She is active with family and grandchildren.  They have continued to be attentive since she lost her  in 2020.  She has no bradycardic symptoms.  She is on high-dose diltiazem of 360 mg p.o. daily for heart rate control.  She has morbid obesity, insomnia and now family history of CAD as sister recently diagnosed with CAD.  On very low-dose rouvastatin without any muscle aching.        Cardiographics (reviewed):  Echo Complete [ECH10] 04/13/2017     Narrative   No previous study for comparison.    No hemodynamically significant valvular heart abnormalities.    Left ventricle ejection fraction is normal. The estimated left   ventricular ejection fraction is 70%          Problem List:  Patient Active Problem List   Diagnosis     Osteoarthritis of left knee     Osteoarthritis of right knee     Persistent atrial fibrillation (H)     SVT (supraventricular tachycardia) (H)     Nonalcoholic steatohepatitis (CRAIG)     Revi  e  Physical Examination Review of Systems   w  [facility]  There were no vitals taken for this visit.  There is no height or weight on file to calculate BMI.  Wt Readings from Last 3 Encounters:   07/14/20 134.3 kg (296 lb)   05/07/18 139.7 kg (308 lb)   08/11/17 136.5 kg (301 lb)     General Appearance:   Alert, well-appearing and in no acute distress.   HEENT: Atraumatic, normocephalic.  No scleral icterus, normal conjunctivae; mucous membranes pink and moist.     Chest: Chest symmetric, spine straight.   Lungs:   Respirations unlabored.   Cardiovascular:   Normal first and second heart sounds with no murmurs, rubs, or gallops.  Irregular, irregular.   Normal JVD, 2-3+ mid shin through pedal edema and skin changes of chronic edema.       Extremities: No cyanosis or clubbing   Musculoskeletal: Moves all extremities   Skin: Warm, dry, intact.    Neurologic: Mood and affect are appropriate, alert and oriented to person, place, time, and situation     ROS: 10 point ROS neg other than the symptoms noted above in the HPI.     Medical History  Surgical History Family History Social History     Past Medical History:   Diagnosis Date     Arthritis      Asthma      Back pain      Hypertension      Lumbar stenosis      SVT (supraventricular tachycardia) (H)     Past Surgical History:   Procedure Laterality Date     APPENDECTOMY       CHOLECYSTECTOMY       HERNIA REPAIR       HYSTERECTOMY  1977     JOINT REPLACEMENT      knee     OOPHORECTOMY Bilateral 1974     OTHER SURGICAL HISTORY Left     knee meniscus repair     TONSILLECTOMY       [facility] TOTAL KNEE ARTHROPLASTY Left 1/4/2016    Procedure: LEFT KNEE TOTAL ARTHROPLASTY;  Surgeon: Ash Campa MD;  Location: [facility];  Service: Orthopedics     [facility] TOTAL KNEE ARTHROPLASTY Right 3/6/2017    Procedure: #3  RIGHT TOTAL KNEE ARTHROPLASTY;  Surgeon: Ash Campa MD;  Location: [facility];  Service: Orthopedics    Family History   Problem Relation Age of Onset     Breast Cancer Maternal Aunt 85.00     Breast  Cancer Cousin      Anesthesia Reaction No family hx of     History   Smoking Status     Never Smoker   Smokeless Tobacco     Never Used     Social History    Substance and Sexual Activity      Alcohol use: No       Medications  Allergies     Current Outpatient Medications   Medication Sig Dispense Refill     acetaminophen (TYLENOL) 500 MG tablet [ACETAMINOPHEN (TYLENOL) 500 MG TABLET] Take 1,000 mg by mouth 3 (three) times a day as needed for pain.       albuterol (PROVENTIL HFA;VENTOLIN HFA) 90 mcg/actuation inhaler [ALBUTEROL (PROVENTIL HFA;VENTOLIN HFA) 90 MCG/ACTUATION INHALER] Inhale 2 puffs every 6 (six) hours as needed for wheezing.       cephalexin (KEFLEX) 500 MG capsule [CEPHALEXIN (KEFLEX) 500 MG CAPSULE] TK 1 C PO TID FOR 10 DAYS       cetirizine (ZYRTEC) 10 MG tablet [CETIRIZINE (ZYRTEC) 10 MG TABLET] Take 10 mg by mouth daily.       cholecalciferol, vitamin D3, 1,000 unit tablet [CHOLECALCIFEROL, VITAMIN D3, 1,000 UNIT TABLET] Take 1,000 Units by mouth daily.       diltiazem (CARDIZEM CD) 120 MG 24 hr capsule [DILTIAZEM (CARDIZEM CD) 120 MG 24 HR CAPSULE] TAKE 1 CAPSULE(120 MG) BY MOUTH DAILY IN ADDITION  MG CAPSULE 90 capsule 0     diltiazem (CARDIZEM CD) 240 MG 24 hr capsule [DILTIAZEM (CARDIZEM CD) 240 MG 24 HR CAPSULE] Take 240 mg by mouth daily. (take with 120mg cap = 360mg total)       fluocinonide (LIDEX) 0.05 % external solution [FLUOCINONIDE (LIDEX) 0.05 % EXTERNAL SOLUTION] Apply 1 application topically bedtime as needed.       fluticasone (FLONASE) 50 mcg/actuation nasal spray [FLUTICASONE (FLONASE) 50 MCG/ACTUATION NASAL SPRAY] Apply 2 sprays into each nostril daily as needed.        furosemide (LASIX) 20 MG tablet [FUROSEMIDE (LASIX) 20 MG TABLET] Take 1 tablet (20 mg total) by mouth daily. Take 1 tablet by mouth daily       ketotifen (ZADITOR/ZYRTEC ITCHY EYES) 0.025 % (0.035 %) ophthalmic solution [KETOTIFEN (ZADITOR/ZYRTEC ITCHY EYES) 0.025 % (0.035 %) OPHTHALMIC SOLUTION]  Administer 1 drop to both eyes 2 (two) times a day as needed.       loratadine (CLARITIN) 10 mg tablet [LORATADINE (CLARITIN) 10 MG TABLET] Take 10 mg by mouth daily.       losartan (COZAAR) 100 MG tablet [LOSARTAN (COZAAR) 100 MG TABLET] Take 100 mg by mouth daily.       methotrexate 2.5 MG tablet [METHOTREXATE 2.5 MG TABLET] TAKE 10 TABLETS PO ONCE EVERY WEEK       MULTIVITAMIN ORAL [MULTIVITAMIN ORAL] Take 1 tablet by mouth daily.       omeprazole (PRILOSEC) 20 MG capsule [OMEPRAZOLE (PRILOSEC) 20 MG CAPSULE] Take 20 mg by mouth Daily before breakfast.        potassium chloride (KLOR-CON) 10 MEQ CR tablet [POTASSIUM CHLORIDE (KLOR-CON) 10 MEQ CR TABLET] Take 10 mEq by mouth daily.       pravastatin (PRAVACHOL) 20 MG tablet [PRAVASTATIN (PRAVACHOL) 20 MG TABLET] Take 20 mg by mouth daily.       predniSONE (DELTASONE) 2.5 MG tablet [PREDNISONE (DELTASONE) 2.5 MG TABLET] CONTINUE 5MG D FOR THE NEXT 6 WEEKS THEN TRY DECREASING TO 2.5MG D IF TOLERATED       warfarin (COUMADIN) 4 MG tablet [WARFARIN (COUMADIN) 4 MG TABLET] Take 4 mg daily. Check INR on 4/17/17, Monday.  Adjust dose based on INR results as directed. 60 tablet 0      Allergies   Allergen Reactions     Aspirin Other (See Comments)     Nose bleeds when taking large amount-many years ago, and dry hands. Tolerated ASA when here for L knee last January     Doxycycline Other (See Comments)     Mouth sores when taken 3/2020, Mouth sores when taken 3/2020     Iodine Hives     Levaquin [Levofloxacin] Other (See Comments)     Tendon pain     Penicillins Unknown     Other reaction(s): *Unknown     Sulfa (Sulfonamide Antibiotics) [Sulfa Drugs] Unknown     From clinic list     Tositumomab Iodine-131 [Iodine I 131 Tositumomab] Hives     Atorvastatin Rash     Ceftin [Cefuroxime] Rash     Patient did have cefazolin 1/2016 without reaction with last knee surgery     Naproxen Rash     Nose bleeds and skin peels from palms and head     Novocain [Procaine] Hives and Rash      As a child      Medical, surgical, family, social history, and medications were all reviewed and updated as necessary.   Lab Results    Chemistry/lipid CBC Cardiac Enzymes/BNP/TSH/INR   No results for input(s): CHOL, HDL, LDL, TRIG, CHOLHDLRATIO in the last 96361 hours.  No results for input(s): LDL in the last 14046 hours.  No results for input(s): NA, POTASSIUM, CHLORIDE, CO2, GLC, BUN, CR, GFRESTIMATED, MARIO in the last 70655 hours.    Invalid input(s): GRFESTBLACK  No results for input(s): CR in the last 98036 hours.  No results for input(s): A1C in the last 94761 hours.       No results for input(s): WBC, HGB, HCT, MCV, PLT in the last 81982 hours.  No results for input(s): HGB in the last 36181 hours. No results for input(s): TROPONINI in the last 33401 hours.  No results for input(s): BNP, NTBNPI, NTBNP in the last 89236 hours.  No results for input(s): TSH in the last 29670 hours.  No results for input(s): INR in the last 90649 hours.       Total Time- 40 minutes spent on date of encounter doing chart review, history and exam, documentation and further activities as noted above.  This note has been dictated using voice recognition software. Any grammatical, typographical, or context distortions are unintentional and inherent to the software.        Thank you for allowing me to participate in the care of your patient.      Sincerely,     ABBIE Alexandra [facility]  cc:   Referred Self,

## 2022-07-05 NOTE — PATIENT INSTRUCTIONS
Vaishali Bernal,    It was a pleasure to see you today at the [facility].     My recommendations after this visit include:    Go to fingerstick for INR checks.    I recommend to you increase rosuvastatin to 5 mg 1 tab orally every evening.  If muscle aching, add on supplement  Coenzyme Q 10 100 mg 1 tab orally every day.    Start melatonin 5 mg 1 tab orally every evening and take it every evening for 3 weeks before you decide if it works.  Talk to Dr. Ordonez about a sleeping pill you can use as needed.  Try falling asleep to audiobooks.    I discussed that you are likely in atrial fibrillation all the time.  24-hour Holter to check that heart rate heart rate control in atrial fibrillation and not too slow at night.  Do this in the next 2 months or so.    To followup with me as needed.      My contact information:  Brendan Lee, ALISSA  After Hours or Scheduling  [phone]  My Nurse---Rani Laguna [phone]

## 2022-07-05 NOTE — PROGRESS NOTES
Thank you, Dr. Ordonez for asking the [facility] team to see Ms. Vaishali Bernal to evaluate persistent- permanent atrial fibrillation.    Assessment/Recommendations     Assessment/Plan:    Diagnoses and all orders for this visit:  Persistent atrial fibrillation (H) and now likely permanent atrial fibrillation.  Notices irregular heartbeat occasionally when short of breath with activity but otherwise asymptomatic now with atrial fibrillation and patient agrees.  She is on high-dose diltiazem of 360 mg p.o. daily, which may be contributing some to peripheral edema but she has low energy level and she is reluctant to switch some heart rate control medication to beta-blocker.  Recommended 24-hour Holter to evaluate heart rates daytime and nighttime.  She walks around her home about once an hour during the daytime for activity.  She does not go out very often except for social activities.  Encouraged use of compression stockings when she is home for peripheral edema/lymphedema.  Will send holter results thru my chart.  SVT (supraventricular tachycardia) (H) history.  Hyperlipidemia with family history of CAD and recommend LDL less than 100.  No muscle aching on a Lily statin 2.5 mg orally every day.  Increase to 5 mg every day.  Had muscle aching with several other statins in the past.  If she develops muscle aching to try to add coenzyme Q 10 100 mg p.o. daily to see if this takes care of muscle aching.  Insomnia and recommended use of melatonin 5 mg orally every evening for 3 weeks before she decides if this can help.  To use blue screen on reading device or switch to audiobooks.  to discuss with Dr. Ordonez as needs as needed sleeping pill.  [id number] score of 3 and on warfarin.  I recommend fingersticks for INRs to prevent scarring at venipuncture sites.  Recently INRs have been quite variable but previously had been stable.  Follow up in clinic with me as needed.  Dr. Ordonez to  refer back to me if she believes that watchman device would be appropriate and helpful if Vaishali continues to have difficulty controlling INRs.     History of Present Illness/Subjective     Vaishali Bernal is a very pleasant 76 year old female who comes in today for EP follow-up for persistent atrial fibrillation.  Vaishali Brenal has a known history of paroxysmal atrial fib in April 2017 and noted to be in persistent A. fib in 2020 and asymptomatic.  Vaishali Bernal has a known history of SVT, lymphedema and Craig.  She reports no change in energy level or tolerance of activity over the last year.  She is not very active due to spinal stenosis and arthritis.  She is on methotrexate.  She uses a walker when she goes out for activities.  She is active with family and grandchildren.  They have continued to be attentive since she lost her  in 2020.  She has no bradycardic symptoms.  She is on high-dose diltiazem of 360 mg p.o. daily for heart rate control.  She has morbid obesity, insomnia and now family history of CAD as sister recently diagnosed with CAD.  On very low-dose rouvastatin without any muscle aching.        Cardiographics (reviewed):  Echo Complete [ECH10] 04/13/2017     Narrative   No previous study for comparison.    No hemodynamically significant valvular heart abnormalities.    Left ventricle ejection fraction is normal. The estimated left   ventricular ejection fraction is 70%          Problem List:  Patient Active Problem List   Diagnosis     Osteoarthritis of left knee     Osteoarthritis of right knee     Persistent atrial fibrillation (H)     SVT (supraventricular tachycardia) (H)     Nonalcoholic steatohepatitis (CRAIG)     Revi  e  Physical Examination Review of Systems   w stems  There were no vitals taken for this visit.  There is no height or weight on file to calculate BMI.  Wt Readings from Last 3 Encounters:   07/14/20 134.3 kg (296 lb)   05/07/18 139.7 kg (308 lb)   08/11/17 136.5 kg (301  lb)     General Appearance:   Alert, well-appearing and in no acute distress.   HEENT: Atraumatic, normocephalic.  No scleral icterus, normal conjunctivae; mucous membranes pink and moist.     Chest: Chest symmetric, spine straight.   Lungs:   Respirations unlabored.   Cardiovascular:   Normal first and second heart sounds with no murmurs, rubs, or gallops.  Irregular, irregular.   Normal JVD, 2-3+ mid shin through pedal edema and skin changes of chronic edema.       Extremities: No cyanosis or clubbing   Musculoskeletal: Moves all extremities   Skin: Warm, dry, intact.    Neurologic: Mood and affect are appropriate, alert and oriented to person, place, time, and situation     ROS: 10 point ROS neg other than the symptoms noted above in the HPI.     Medical History  Surgical History Family History Social History     Past Medical History:   Diagnosis Date     Arthritis      Asthma      Back pain      Hypertension      Lumbar stenosis      SVT (supraventricular tachycardia) (H)     Past Surgical History:   Procedure Laterality Date     APPENDECTOMY       CHOLECYSTECTOMY       HERNIA REPAIR       HYSTERECTOMY  1977     JOINT REPLACEMENT      knee     OOPHORECTOMY Bilateral 1974     OTHER SURGICAL HISTORY Left     knee meniscus repair     TONSILLECTOMY       [facility] TOTAL KNEE ARTHROPLASTY Left 1/4/2016    Procedure: LEFT KNEE TOTAL ARTHROPLASTY;  Surgeon: Ash Campa MD;  Location: [facility];  Service: Orthopedics     [facility] TOTAL KNEE ARTHROPLASTY Right 3/6/2017    Procedure: #3  RIGHT TOTAL KNEE ARTHROPLASTY;  Surgeon: Ash Campa MD;  Location: [facility];  Service: Orthopedics    Family History   Problem Relation Age of Onset     Breast Cancer Maternal Aunt 85.00     Breast Cancer Cousin      Anesthesia Reaction No family hx of     History   Smoking Status     Never Smoker   Smokeless Tobacco     Never Used     Social History    Substance and Sexual Activity      Alcohol use: No       Medications   Allergies     Current Outpatient Medications   Medication Sig Dispense Refill     acetaminophen (TYLENOL) 500 MG tablet [ACETAMINOPHEN (TYLENOL) 500 MG TABLET] Take 1,000 mg by mouth 3 (three) times a day as needed for pain.       albuterol (PROVENTIL HFA;VENTOLIN HFA) 90 mcg/actuation inhaler [ALBUTEROL (PROVENTIL HFA;VENTOLIN HFA) 90 MCG/ACTUATION INHALER] Inhale 2 puffs every 6 (six) hours as needed for wheezing.       cephalexin (KEFLEX) 500 MG capsule [CEPHALEXIN (KEFLEX) 500 MG CAPSULE] TK 1 C PO TID FOR 10 DAYS       cetirizine (ZYRTEC) 10 MG tablet [CETIRIZINE (ZYRTEC) 10 MG TABLET] Take 10 mg by mouth daily.       cholecalciferol, vitamin D3, 1,000 unit tablet [CHOLECALCIFEROL, VITAMIN D3, 1,000 UNIT TABLET] Take 1,000 Units by mouth daily.       diltiazem (CARDIZEM CD) 120 MG 24 hr capsule [DILTIAZEM (CARDIZEM CD) 120 MG 24 HR CAPSULE] TAKE 1 CAPSULE(120 MG) BY MOUTH DAILY IN ADDITION  MG CAPSULE 90 capsule 0     diltiazem (CARDIZEM CD) 240 MG 24 hr capsule [DILTIAZEM (CARDIZEM CD) 240 MG 24 HR CAPSULE] Take 240 mg by mouth daily. (take with 120mg cap = 360mg total)       fluocinonide (LIDEX) 0.05 % external solution [FLUOCINONIDE (LIDEX) 0.05 % EXTERNAL SOLUTION] Apply 1 application topically bedtime as needed.       fluticasone (FLONASE) 50 mcg/actuation nasal spray [FLUTICASONE (FLONASE) 50 MCG/ACTUATION NASAL SPRAY] Apply 2 sprays into each nostril daily as needed.        furosemide (LASIX) 20 MG tablet [FUROSEMIDE (LASIX) 20 MG TABLET] Take 1 tablet (20 mg total) by mouth daily. Take 1 tablet by mouth daily       ketotifen (ZADITOR/ZYRTEC ITCHY EYES) 0.025 % (0.035 %) ophthalmic solution [KETOTIFEN (ZADITOR/ZYRTEC ITCHY EYES) 0.025 % (0.035 %) OPHTHALMIC SOLUTION] Administer 1 drop to both eyes 2 (two) times a day as needed.       loratadine (CLARITIN) 10 mg tablet [LORATADINE (CLARITIN) 10 MG TABLET] Take 10 mg by mouth daily.       losartan (COZAAR) 100 MG tablet [LOSARTAN (COZAAR) 100 MG  TABLET] Take 100 mg by mouth daily.       methotrexate 2.5 MG tablet [METHOTREXATE 2.5 MG TABLET] TAKE 10 TABLETS PO ONCE EVERY WEEK       MULTIVITAMIN ORAL [MULTIVITAMIN ORAL] Take 1 tablet by mouth daily.       omeprazole (PRILOSEC) 20 MG capsule [OMEPRAZOLE (PRILOSEC) 20 MG CAPSULE] Take 20 mg by mouth Daily before breakfast.        potassium chloride (KLOR-CON) 10 MEQ CR tablet [POTASSIUM CHLORIDE (KLOR-CON) 10 MEQ CR TABLET] Take 10 mEq by mouth daily.       pravastatin (PRAVACHOL) 20 MG tablet [PRAVASTATIN (PRAVACHOL) 20 MG TABLET] Take 20 mg by mouth daily.       predniSONE (DELTASONE) 2.5 MG tablet [PREDNISONE (DELTASONE) 2.5 MG TABLET] CONTINUE 5MG D FOR THE NEXT 6 WEEKS THEN TRY DECREASING TO 2.5MG D IF TOLERATED       warfarin (COUMADIN) 4 MG tablet [WARFARIN (COUMADIN) 4 MG TABLET] Take 4 mg daily. Check INR on 4/17/17, Monday.  Adjust dose based on INR results as directed. 60 tablet 0      Allergies   Allergen Reactions     Aspirin Other (See Comments)     Nose bleeds when taking large amount-many years ago, and dry hands. Tolerated ASA when here for L knee last January     Doxycycline Other (See Comments)     Mouth sores when taken 3/2020, Mouth sores when taken 3/2020     Iodine Hives     Levaquin [Levofloxacin] Other (See Comments)     Tendon pain     Penicillins Unknown     Other reaction(s): *Unknown     Sulfa (Sulfonamide Antibiotics) [Sulfa Drugs] Unknown     From clinic list     Tositumomab Iodine-131 [Iodine I 131 Tositumomab] Hives     Atorvastatin Rash     Ceftin [Cefuroxime] Rash     Patient did have cefazolin 1/2016 without reaction with last knee surgery     Naproxen Rash     Nose bleeds and skin peels from palms and head     Novocain [Procaine] Hives and Rash     As a child      Medical, surgical, family, social history, and medications were all reviewed and updated as necessary.   Lab Results    Chemistry/lipid CBC Cardiac Enzymes/BNP/TSH/INR   No results for input(s): CHOL, HDL, LDL,  TRIG, CHOLHDLRATIO in the last 38714 hours.  No results for input(s): LDL in the last 32695 hours.  No results for input(s): NA, POTASSIUM, CHLORIDE, CO2, GLC, BUN, CR, GFRESTIMATED, MARIO in the last 03838 hours.    Invalid input(s): GRFESTBLACK  No results for input(s): CR in the last 80862 hours.  No results for input(s): A1C in the last 27254 hours.       No results for input(s): WBC, HGB, HCT, MCV, PLT in the last 20752 hours.  No results for input(s): HGB in the last 75758 hours. No results for input(s): TROPONINI in the last 50886 hours.  No results for input(s): BNP, NTBNPI, NTBNP in the last 23209 hours.  No results for input(s): TSH in the last 52970 hours.  No results for input(s): INR in the last 01768 hours.       Total Time- 40 minutes spent on date of encounter doing chart review, history and exam, documentation and further activities as noted above.  This note has been dictated using voice recognition software. Any grammatical, typographical, or context distortions are unintentional and inherent to the software.

## 2022-07-12 ENCOUNTER — HOSPITAL ENCOUNTER (OUTPATIENT)
Dept: CARDIOLOGY | Facility: HOSPITAL | Age: 77
Discharge: HOME | End: 2022-07-12
Attending: NURSE PRACTITIONER | Admitting: NURSE PRACTITIONER
Payer: COMMERCIAL

## 2022-07-12 DIAGNOSIS — I48.21 PERMANENT ATRIAL FIBRILLATION (H): ICD-10-CM

## 2022-07-12 PROCEDURE — 93226 XTRNL ECG REC<48 HR SCAN A/R: CPT

## 2022-07-19 PROCEDURE — 93227 XTRNL ECG REC<48 HR R&I: CPT | Performed by: INTERNAL MEDICINE

## 2022-07-21 DIAGNOSIS — I48.0 PAROXYSMAL ATRIAL FIBRILLATION (H): Primary | ICD-10-CM

## 2022-07-21 DIAGNOSIS — I48.19 PERSISTENT ATRIAL FIBRILLATION (H): Primary | ICD-10-CM

## 2022-07-21 RX ORDER — METOPROLOL SUCCINATE 50 MG/1
50 TABLET, EXTENDED RELEASE ORAL EVERY EVENING
Qty: 90 TABLET | Refills: 3 | Status: SHIPPED | OUTPATIENT
Start: 2022-07-21 | End: 2022-07-29 | Stop reason: DRUGHIGH

## 2022-07-29 ENCOUNTER — TELEPHONE (OUTPATIENT)
Dept: CARDIOLOGY | Facility: CLINIC | Age: 77
End: 2022-07-29

## 2022-07-29 DIAGNOSIS — I48.0 PAROXYSMAL ATRIAL FIBRILLATION (H): Primary | ICD-10-CM

## 2022-07-29 RX ORDER — METOPROLOL SUCCINATE 25 MG/1
25 TABLET, EXTENDED RELEASE ORAL AT BEDTIME
Start: 2022-07-29 | End: 2023-12-14

## 2022-07-29 NOTE — TELEPHONE ENCOUNTER
----- Message from ABBIE Vazquez CNP sent at 7/28/2022  4:29 PM CDT -----  Decrease metoprolol to XL 25 mg daily.  Call Monday if SBP is still <90 or Hrs >100.  ThanksShirley  ----- Message -----  From: Rani Laguna  Sent: 7/28/2022   2:59 PM CDT  To: ABBIE Vazquez CNP        PT JUST CALLED  IS AWARE KYM IS OUT   I AM HERE TILL 3 30 AND OUT TOMORROW    PT ISSUE SEEN JAN 7/21 SEE NOTE AND SEE OUR NOTES ATTACH TO HOLTER REPORT    PT HAVING ISSUES WITH FATIGUE WITH MED HR IN THE 60'S BP 74/55 76/62 71/50    I TOLD HER I WOULD SEND NOTE TO YOU MAY NOT GET CALL BACK TODAY IF SHE DID NOT HEAR BACK SHE IS TO CALL TOMORROW TO EP LINE FOR FOLLOW UP    ODALYS OSWALD

## 2022-07-29 NOTE — TELEPHONE ENCOUNTER
Noted.  Return call to patient, she states that her blood pressure is better today 118/70 and her hr is about 80, though she has not taken any medications yet today, she does take Metoprolol at bedtime.  Pt states that she is an avid water drinker and is always drinking water, she is hydrated.    Reviewed recommendations to decrease Metoprolol and she is agreeable.  Reviewed parameters below and she will call back with continued symptoms.  Medication list updated.

## 2022-08-11 ENCOUNTER — HOSPITAL ENCOUNTER (OUTPATIENT)
Dept: CARDIOLOGY | Facility: HOSPITAL | Age: 77
Discharge: HOME | End: 2022-08-11
Attending: NURSE PRACTITIONER | Admitting: NURSE PRACTITIONER
Payer: COMMERCIAL

## 2022-08-11 DIAGNOSIS — I48.0 PAROXYSMAL ATRIAL FIBRILLATION (H): ICD-10-CM

## 2022-08-11 PROCEDURE — 93226 XTRNL ECG REC<48 HR SCAN A/R: CPT

## 2022-08-16 PROCEDURE — 93227 XTRNL ECG REC<48 HR R&I: CPT | Performed by: INTERNAL MEDICINE

## 2022-11-21 ENCOUNTER — HEALTH MAINTENANCE LETTER (OUTPATIENT)
Age: 77
End: 2022-11-21

## 2023-02-27 ENCOUNTER — TRANSCRIBE ORDERS (OUTPATIENT)
Dept: OTHER | Age: 78
End: 2023-02-27

## 2023-02-27 DIAGNOSIS — I89.0 CHRONIC ACQUIRED LYMPHEDEMA: Primary | ICD-10-CM

## 2023-03-23 ENCOUNTER — HOSPITAL ENCOUNTER (OUTPATIENT)
Dept: PHYSICAL THERAPY | Facility: REHABILITATION | Age: 78
Discharge: HOME | End: 2023-03-23
Attending: FAMILY MEDICINE
Payer: COMMERCIAL

## 2023-03-23 DIAGNOSIS — I89.0 LYMPHEDEMA: Primary | ICD-10-CM

## 2023-03-23 DIAGNOSIS — M62.81 GENERALIZED MUSCLE WEAKNESS: ICD-10-CM

## 2023-03-23 PROCEDURE — 97161 PT EVAL LOW COMPLEX 20 MIN: CPT | Mod: GP | Performed by: PHYSICAL THERAPIST

## 2023-03-23 PROCEDURE — 97535 SELF CARE MNGMENT TRAINING: CPT | Mod: GP | Performed by: PHYSICAL THERAPIST

## 2023-03-23 NOTE — PROGRESS NOTES
[facility]        OUTPATIENT PHYSICAL THERAPY EDEMA EVALUATION  PLAN OF TREATMENT FOR OUTPATIENT REHABILITATION  (COMPLETE FOR INITIAL CLAIMS ONLY)  Patient's Last Name, First Name, M.I.  Vaishali Bernal                           Provider s Name:   [facility] Medical Record No.  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 Goal description: Pt will be independent in HEP to address impairments and improve function       Target date: 06/21/23  2. Goal description: Pt will demonstrate self-management of condition including compression wear/care, exercises, skin care, self-MLD or compression pump.       Target date: 06/21/23  3. Goal description: Pt will demonstrate an improvement in her LE swelling by >5% on left to reduce risk of infection and improve lymphatic function       Target date: 06/21/23  4.            5.            6.               7.             8.              Treatment Frequency: Other (see comments) (1-3x/week)   Treatment duration: 12 weeks for up to 16 sessions    Josephine Mena, PT                                    I CERTIFY THE NEED FOR THESE SERVICES FURNISHED UNDER        THIS PLAN  OF TREATMENT AND WHILE UNDER MY CARE .             Physician Signature               Date    X_____________________________________________________                        Certification date from: 03/23/23       Certification date to: 06/21/23           Referring physician: Yung Ordonez   Initial Assessment  See Epic Evaluation- Start of care: 03/23/23 03/23/23 0700   Quick Adds   Quick Adds Certification   Rehab Discipline   Discipline PT   Type of Visit   Type of visit Initial Edema Evaluation       present No   General Information   Start of care 03/23/23   Referring physician Yung Ordonez   Medical diagnosis Chronic acquired lymphedema   Affected body parts LLE;RLE  (left is worse)   Pertinent history of current problem (PT: include personal factors and/or comorbidities that impact the POC; OT: include additional occupational profile info) Pt reports she has compression socks. She has swelling that started years ago. She went to go up a stair and fell on the stair. She thought they had broken her leg. She had swelling ever since then. Her swelling increased after her TKA as well. Recently, her swelling has progressively worsened. She initially had plans to go to PT about 1 year ago but personal factors have limited her ability to come. Due to her stenosis in her lumbar spine she is not able to lay flat at night. She tries to raise her legs while in her recliner. She reports a sunburn last April with a lasting affect. Her compression socks are 40-50 mmHg. She reports wrapping in the past has fallen down quite a bit.   Prior treatment Compression garments;Complete decongestive therapy;MLD;Gradient compression bandaging   Current assistive devices 4 wheeled walker   Fall Risk Screen   Fall screen completed by PT   Have you fallen 2 or more times in the past year? No   Have you fallen and had an injury in the past year? No   Is patient a fall risk? Yes   Fall  screen comments has a walker that she uses in the community, uses it at home to help with carrying objects, safety throughout the session noted,   Abuse Screen (yes response referral indicated)   Feels Unsafe at Home or Work/School no   Feels Threatened by Someone no   Does Anyone Try to Keep You From Having Contact with Others or Doing Things Outside Your Home? no   Physical Signs of Abuse Present no   Patient needs abuse support services and resources No   System Outcome Measures   Outcome Measures Lymphedema   Lymphedema Life Impact Scale (score range 0-72). A higher score indicates greater impairment.   (pt to bring back completed form to first follow up)   Patient / Family Goals   Patient / family goals statement reduce swelling and improve self-management   Cognitive Status   Orientation Orientation to person, place and time   Level of consciousness Alert   Edema Exam / Assessment   Skin condition Pitting;Dryness;Intact;Lipodermatosclerosis   Pitting 1+   Pitting location around aleshia malleoulus and distal half of aleshia lower legs   Scar Yes   Location anterior knees   Scar comments form aleshia TKA   Capillary refill Symmetrical   Stemmer sign Positive   Stemmer sign comments on left   Ulceration No   Girth Measurements   Girth Measurements Refer to separate girth measurement flowsheet   Volume LE   Right LE (mL) 7999.39   Left LE (mL) 9618.49   LE volume comparison LLE volume greater than RLE volume   % difference 17   Transfers   Transfers Independent with sit<>stand   Coordination   Coordination Gross motor coordination appropriate   Muscle Tone   Muscle tone No deficits were identified   Planned Edema Interventions   Planned edema interventions Manual lymph drainage;Gradient compression bandaging;Fit for compression garment;Exercises;Precautions to prevent infection / exacerbation;Education;Manual therapy;Skin care / precautions;Scar mobilization;Soft tissue mobilization;Myofascial release;Home management program  development   Clinical Impression   Criteria for skilled therapeutic intervention met Yes   Therapy diagnosis secondary lymphedema, possible component of lipedema as well   Influenced by the following impairments / conditions Edema;Stage 2   Functional limitations due to impairments / conditions standing, walking, transitions, donning/doffing compression garments   Clinical Presentation Stable/Uncomplicated   Clinical Decision Making (Complexity) Low complexity   Treatment Frequency Other (see comments)  (1-3x/week)   Treatment duration 12 weeks for up to 16 sessions   Patient / family and/or staff in agreement with plan of care Yes   Risks and benefits of therapy have been explained Yes   Clinical impression comments Pt presents with aleshia lower extremity swelling L>R that started years ago. Pt reports worsenening of swelling over the past few years with previously being unable to commit to time required to participate in therapy. Pt appropriate for skilled PT intervention to reduce swelling and improve home management of lymphedema.   Goals   Edema Eval Goals 1;2;3   Goal 1   Goal identifier HEP   Goal description Pt will be independent in HEP to address impairments and improve function   Target date 06/21/23   Goal 2   Goal identifier Self-management   Goal description Pt will demonstrate self-management of condition including compression wear/care, exercises, skin care, self-MLD or compression pump.   Target date 06/21/23   Goal 3   Goal identifier Edema   Goal description Pt will demonstrate an improvement in her LE swelling by >5% on left to reduce risk of infection and improve lymphatic function   Target date 06/21/23   Total Evaluation Time   PT Eval, Low Complexity Minutes (32305) 25   Certification   Certification date from 03/23/23   Certification date to 06/21/23   Medical Diagnosis Chronic acquired lymphedema   Certification I certify the need for these services furnished under this plan of treatment and  while under my care.  (Physician co-signature of this document indicates review and certification of the therapy plan).      Josephine Mena, PT, DPT, CLT-EARLINE

## 2023-03-30 ENCOUNTER — HOSPITAL ENCOUNTER (OUTPATIENT)
Dept: PHYSICAL THERAPY | Facility: REHABILITATION | Age: 78
Discharge: HOME | End: 2023-03-30
Attending: FAMILY MEDICINE
Payer: COMMERCIAL

## 2023-03-30 DIAGNOSIS — I89.0 LYMPHEDEMA: Primary | ICD-10-CM

## 2023-03-30 DIAGNOSIS — M62.81 GENERALIZED MUSCLE WEAKNESS: ICD-10-CM

## 2023-03-30 PROCEDURE — 97140 MANUAL THERAPY 1/> REGIONS: CPT | Mod: GP | Performed by: PHYSICAL THERAPIST

## 2023-04-06 ENCOUNTER — HOSPITAL ENCOUNTER (OUTPATIENT)
Dept: PHYSICAL THERAPY | Facility: REHABILITATION | Age: 78
Discharge: HOME | End: 2023-04-06
Attending: FAMILY MEDICINE
Payer: COMMERCIAL

## 2023-04-06 DIAGNOSIS — I89.0 LYMPHEDEMA: Primary | ICD-10-CM

## 2023-04-06 DIAGNOSIS — M62.81 GENERALIZED MUSCLE WEAKNESS: ICD-10-CM

## 2023-04-06 PROCEDURE — 97535 SELF CARE MNGMENT TRAINING: CPT | Mod: GP | Performed by: PHYSICAL THERAPIST

## 2023-04-06 PROCEDURE — 97140 MANUAL THERAPY 1/> REGIONS: CPT | Mod: GP | Performed by: PHYSICAL THERAPIST

## 2023-04-06 NOTE — PROGRESS NOTES
04/06/23 0700   Signing Clinician's Name / Credentials   Signing clinician's name / credentials Rama Tyrell, PT, DPT, CLT-EARLINE   Session Number   Session Number 3/16   Progress Note/Recertification   Progress Note Due Date   (10th session)   Recertification Due Date 06/21/23   Adult Goals   Edema Eval Goals 1;2;3   Goal 1   Goal identifier HEP   Goal description Pt will be independent in HEP to address impairments and improve function   Target date 06/21/23   Goal 2   Goal identifier Self-management   Goal description Pt will demonstrate self-management of condition including compression wear/care, exercises, skin care, self-MLD or compression pump.   Target date 06/21/23   Goal 3   Goal identifier Edema   Goal description Pt will demonstrate an improvement in her LE swelling by >5% on left to reduce risk of infection and improve lymphatic function   Target date 06/21/23   Subjective Report   Subjective Report Patient reports she has been doing ok, wraps slid down somewhat after 1.5 days and created a tight spot under the knee and had to be taken off. Patient reports that the lower leg seems to do well overall but te thigh and hip area are still increased overall. [employer] rep in clinic today to review basic pump and flexitouch.   Objective Measures   Objective Measures Objective Measure 1   Objective Measure 1   Objective Measure measures   Details [employer] rep did pre and post measurements of the legs, noted increased in the upper thigh and hip after trial of the basic pump. premeasure 167cm and post was 169cm   System Outcome Measures   Lymphedema Life Impact Scale (score range 0-72). A higher score indicates greater impairment.   (pt to bring back completed form to first follow up)   Treatment Interventions   Interventions Self Care/Home Management;Manual Therapy   Manual Therapy   Manual Therapy: Mobilization, MFR, MLD, friction massage minutes (08272) 30   Skilled Intervention MLD,  compression bandaging   Patient Response good tolerance overall for MLD   Treatment Detail MLD to for right LE evacuation starting with inguinal node clearing while L LE was in a pump for a clinical trial of the basic and advanced pumps; education on wrapping and purpose of the compression wraps vs garments and role in reducing edema. Patient did not want nor was there time for the wraps in clinic today.   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 disucssed compression wrapping and ongoing POC.   Education   Learner Patient   Readiness Acceptance   Method Explanation;Booklet/handout;Demonstration   Response Verbalizes Understanding   Communication with other professionals   Communication with other professionals [employer] for pneumatic compression pump - rep came for a trial in clinic today   Plan   Home program ankle pumps   Plan for next session measurements taken for next session;  MLD to aleshia LEs, medical compression bandages (start with knee highs aleshia and might need to progress to thigh high on left), LE lymphedema exercises, walking program, LE strengthening for balance/gait   Comments   Comments Patient did a basic pump trial in clinic today with [employer] rep. this pump provided pneumatic compression fromdistal to proximal. she tolerate well in clinic but post treatment noted an increased in  hip measurements. She then trialed the flexitouch in clinic as well, Patient had to remove the wraps after 1.5 days due to sliding down and creating a tight spot, she did not want to be wrapped today in clinic overall after the trials. Plan to measure again next week and will modify compression wraps as needed and continued with MLD and exercise at next session.   Total Session Time   Timed Code Treatment Minutes 53   Total Treatment Time (sum of timed and untimed services) 53   Medicare Claim Information   Medical diagnosis Chronic acquired lymphedema   Therapy diagnosis secondary lymphedema, possible component of lipedema as well   Start of care 03/23/23   Certification date from 03/23/23     Rama Santillan, PT, DPT, CLMERLE-EARLINE

## 2023-04-13 ENCOUNTER — HOSPITAL ENCOUNTER (OUTPATIENT)
Dept: PHYSICAL THERAPY | Facility: REHABILITATION | Age: 78
Discharge: HOME | End: 2023-04-13
Payer: COMMERCIAL

## 2023-04-13 DIAGNOSIS — M62.81 GENERALIZED MUSCLE WEAKNESS: ICD-10-CM

## 2023-04-13 DIAGNOSIS — I89.0 LYMPHEDEMA: Primary | ICD-10-CM

## 2023-04-13 PROCEDURE — 97140 MANUAL THERAPY 1/> REGIONS: CPT | Mod: GP | Performed by: PHYSICAL THERAPIST

## 2023-04-13 NOTE — ADDENDUM NOTE
Encounter addended by: Rama Santillan, PT on: 4/13/2023 11:38 AM   Actions taken: Clinical Note Signed, Flowsheet accepted

## 2023-04-13 NOTE — PROGRESS NOTES
03/23/23 0700   Signing Clinician's Name / Credentials   Signing clinician's name / credentials Josephine Mena, PT, DPT, CLT-EARLINE   Discipline   Discipline PT   Lower Extremity Girth Measurements   RT: Great Toe 8.2 cms   RT: 10 cm above heel 23.8 cms  (starting at base of 2nd toe)   RT: 20 cm above heel 26.4 cms   RT: 30 cm above heel 28 cms   RT: 40 cm above heel 39.6 cms   RT: 50 cm above heel 42.9 cms   RT: 60 cm above heel 55.5 cms   RT: 70 cm above heel 61.1 cms   RT: Lower Extremity Total Volume 7999.39   LT: Great Toe 8.5 cms   LT: 10 cm above heel 23.3 cms  (starting at base of 2nd toe)   LT: 20 cm above heel 24.8 cms   LT: 30 cm above heel 30.8 cms   LT: 40 cm above heel 39.6 cms   LT: 50 cm above heel 48.7 cms   LT: 60 cm above heel 60.6 cms   LT: 70 cm above heel 74 cms  (over pants as they could not be pulled up high enough for measure due to increased size on this side)   LT: Lower Extremity Total Volume 9618.49

## 2023-04-13 NOTE — PROGRESS NOTES
04/13/23 0700   Signing Clinician's Name / Credentials   Signing clinician's name / credentials Rama Santillan, PT, DPT, CLT-EARLINE   Discipline   Discipline PT   Lower Extremity Girth Measurements   RT: Great Toe 8.1 cms   RT: 10 cm above heel 23.4 cms  (starting at base of 2nd toe)   RT: 20 cm above heel 27.8 cms   RT: 30 cm above heel 28.1 cms   RT: 40 cm above heel 40.8 cms   RT: 50 cm above heel 43 cms   RT: 60 cm above heel 55.5 cms   RT: 70 cm above heel 62 cms   RT: Lower Extremity Total Volume 8179.04   RT: Lower Extremity % Volume Change +2.2% from initial   LT: Great Toe 8.5 cms   LT: 10 cm above heel 23.4 cms  (starting at base of 2nd toe)   LT: 20 cm above heel 24.5 cms   LT: 30 cm above heel 31.4 cms   LT: 40 cm above heel 39.5 cms   LT: 50 cm above heel 48.5 cms   LT: 60 cm above heel 64 cms   LT: 70 cm above heel 74 cms   LT: Lower Extremity Total Volume 9951.18   LT: Lower Extremity % Volume Change +3.3% from initial measurement

## 2023-04-13 NOTE — PROGRESS NOTES
04/13/23 1100   Signing Clinician's Name / Credentials   Signing clinician's name / credentials Rama Santillan, PT, DPT, CLT-EARLINE   Session Number   Session Number 4/16   Progress Note/Recertification   Progress Note Due Date   (10th session)   Recertification Due Date 06/21/23   Adult Goals   Edema Eval Goals 1;2;3   Goal 1   Goal identifier HEP   Goal description Pt will be independent in HEP to address impairments and improve function   Target date 06/21/23   Goal 2   Goal identifier Self-management   Goal description Pt will demonstrate self-management of condition including compression wear/care, exercises, skin care, self-MLD or compression pump.   Target date 06/21/23   Goal 3   Goal identifier Edema   Goal description Pt will demonstrate an improvement in her LE swelling by >5% on left to reduce risk of infection and improve lymphatic function   Target date 06/21/23   Subjective Report   Subjective Report Has not been wrapped for more than a week now, felt some increased swelling after easter and having her feet down more. She does want to get the pump for at home as she felt it was beneficial and comfortable when she did trial last week.   Objective Measures   Objective Measures Objective Measure 1   Objective Measure 1   Objective Measure measures   Details see flowsheet for details - right LE increased 2.2% from initial measurements and left LE increased 3.3% from initial as noted today in clinic.   Treatment Interventions   Interventions Self Care/Home Management;Manual Therapy   Manual Therapy   Manual Therapy: Mobilization, MFR, MLD, friction massage minutes (80591) 55   Skilled Intervention MLD, compression bandaging   Patient Response good tolerance overall for MLD   Treatment Detail measurements taken; MLD to for aleshia LE evacuation starting with inguinal node clearing with education on how to complete self-MLD to thigh especially on right, medical compression bandages as follows: Eucerin lotion,  rosidal foam from ankle to knee, comprilan 8 cm x 5 m foot to ankle, 10 cm x 5 m herrinbone ankle to knee, 12 cm x 5 m herringbone pattern ankle to knee, education on wear time and to use compresion socks afterwards, education on removal if signs of poor tolerance, discussed wrapping to the thigh and options for this   Progress pt in long sitting due to low back pain   Education   Learner Patient   Readiness Acceptance   Method Explanation;Booklet/handout;Demonstration   Response Verbalizes Understanding   Communication with other professionals   Communication with other professionals [employer] for pneumatic compression pump -sent notes with measurements on this today   Plan   Home program ankle pumps   Plan for next session MLD to aleshia LEs, medical compression bandages (start with knee highs aleshia and might need to progress to thigh high on left), LE lymphedema exercises, walking program, LE strengthening for balance/gait   Comments   Comments Patient noted to have an increase in edema from initial evaluation, she has not had wraps on since last session as she was not able to wrap herself. Patient will benefit from continued therapy to maximize recovery and self management techniques   Total Session Time   Timed Code Treatment Minutes 55   Total Treatment Time (sum of timed and untimed services) 55   Medicare Claim Information   Medical diagnosis Chronic acquired lymphedema   Therapy diagnosis secondary lymphedema, possible component of lipedema as well   Start of care 03/23/23   Certification date from 03/23/23     Rama Santillan, PT, DPT, ROBERTO

## 2023-04-17 ENCOUNTER — HOSPITAL ENCOUNTER (OUTPATIENT)
Dept: PHYSICAL THERAPY | Facility: REHABILITATION | Age: 78
Discharge: HOME | End: 2023-04-17
Payer: COMMERCIAL

## 2023-04-17 DIAGNOSIS — M62.81 GENERALIZED MUSCLE WEAKNESS: ICD-10-CM

## 2023-04-17 DIAGNOSIS — I89.0 LYMPHEDEMA: Primary | ICD-10-CM

## 2023-04-17 PROCEDURE — 97110 THERAPEUTIC EXERCISES: CPT | Mod: GP | Performed by: PHYSICAL THERAPIST

## 2023-04-17 PROCEDURE — 97140 MANUAL THERAPY 1/> REGIONS: CPT | Mod: GP | Performed by: PHYSICAL THERAPIST

## 2023-04-20 ENCOUNTER — HOSPITAL ENCOUNTER (OUTPATIENT)
Dept: PHYSICAL THERAPY | Facility: REHABILITATION | Age: 78
Discharge: HOME | End: 2023-04-20
Payer: COMMERCIAL

## 2023-04-20 DIAGNOSIS — I89.0 LYMPHEDEMA: Primary | ICD-10-CM

## 2023-04-20 DIAGNOSIS — M62.81 GENERALIZED MUSCLE WEAKNESS: ICD-10-CM

## 2023-04-20 PROCEDURE — 97140 MANUAL THERAPY 1/> REGIONS: CPT | Mod: GP | Performed by: PHYSICAL THERAPIST

## 2023-04-24 ENCOUNTER — HOSPITAL ENCOUNTER (OUTPATIENT)
Dept: PHYSICAL THERAPY | Facility: REHABILITATION | Age: 78
Discharge: HOME | End: 2023-04-24
Payer: COMMERCIAL

## 2023-04-24 DIAGNOSIS — M62.81 GENERALIZED MUSCLE WEAKNESS: ICD-10-CM

## 2023-04-24 DIAGNOSIS — I89.0 LYMPHEDEMA: Primary | ICD-10-CM

## 2023-04-24 PROCEDURE — 97140 MANUAL THERAPY 1/> REGIONS: CPT | Mod: GP | Performed by: PHYSICAL THERAPIST

## 2023-04-24 NOTE — ADDENDUM NOTE
Encounter addended by: Josephine Mena, PT on: 4/24/2023 9:48 AM   Actions taken: Pend clinical note, Flowsheet accepted, Clinical Note Signed

## 2023-04-24 NOTE — PROGRESS NOTES
[facility]    Outpatient Physical Therapy Progress Note  Patient: Vaishali Bernal  : 1945    Beginning/End Dates of Reporting Period:  3/23/23 to 23    Referring Provider: Yung Corea Diagnosis: secondary lymphedema     Client Self Report: Pt reports the heel started to slight off of one of the wraps after 1 day. She is working on the exercises and her legs are about the same. They look better right after taking off the compression bandages.     Objective Measurements:  Objective Measure: LE measurements- see edema girth flowsheet for detalis  Details: overall 7% and 3% increase on R and L LE from the initial evaluation            Goals:  Goal Identifier HEP   Goal Description Pt will be independent in HEP to address impairments and improve function   Target Date 23   Date Met  23   Progress (detail required for progress note): Met     Goal Identifier Self-management   Goal Description Pt will demonstrate self-management of condition including compression wear/care, exercises, skin care, self-MLD or compression pump.   Target Date 23   Date Met      Progress (detail required for progress note): improving- knows exercises, skin care and self-MLD, working on obtaining a pneumatic compression pump     Goal Identifier Edema   Goal Description Pt will demonstrate an improvement in her LE swelling by >5% on left to reduce risk of infection and improve lymphatic function   Target Date 23   Date Met      Progress (detail required for progress note): re-measured today- increased     Pt has completed greater than 4 weeks of conservative treatment without significant improvement including elevation, exercise, compression garments, and compression wraps. Pt demonstrated a slight increase in LE swelling today since her initial evaluation on 3/23/23.     Plan:  Continue  therapy per current plan of care.    Discharge:  No    Josephine Mena, PT, DPT, CLMERLE-EARLINE

## 2023-04-27 ENCOUNTER — HOSPITAL ENCOUNTER (OUTPATIENT)
Dept: PHYSICAL THERAPY | Facility: REHABILITATION | Age: 78
Discharge: HOME | End: 2023-04-27
Payer: COMMERCIAL

## 2023-04-27 DIAGNOSIS — M62.81 GENERALIZED MUSCLE WEAKNESS: ICD-10-CM

## 2023-04-27 DIAGNOSIS — I89.0 LYMPHEDEMA: Primary | ICD-10-CM

## 2023-04-27 PROCEDURE — 97110 THERAPEUTIC EXERCISES: CPT | Mod: GP | Performed by: PHYSICAL THERAPIST

## 2023-04-27 PROCEDURE — 97140 MANUAL THERAPY 1/> REGIONS: CPT | Mod: GP | Performed by: PHYSICAL THERAPIST

## 2023-04-27 NOTE — PROGRESS NOTES
[facility]    Outpatient Physical Therapy Discharge Note  Patient: Vaishali Bernal  : 1945    Beginning/End Dates of Reporting Period:  3/23/23 to 23    Referring Provider: Yung Corea Diagnosis: secondary lymphedema, possible component of lipedema as well     Client Self Report: Pt reports her legs are doing better.    Objective Measurements:  Objective Measure: LE measurements- at previous session             Goals:  Goal Identifier HEP   Goal Description Pt will be independent in HEP to address impairments and improve function   Target Date 23   Date Met  23   Progress (detail required for progress note): Met     Goal Identifier Self-management   Goal Description Pt will demonstrate self-management of condition including compression wear/care, exercises, skin care, self-MLD or compression pump.   Target Date 23   Date Met      Progress (detail required for progress note): improving- knows exercises, skin care and self-MLD, working on obtaining a pneumatic compression pump     Goal Identifier Edema   Goal Description Pt will demonstrate an improvement in her LE swelling by >5% on left to reduce risk of infection and improve lymphatic function   Target Date 23   Date Met      Progress (detail required for progress note): re-measured- increased         Plan:  Discharge from therapy.    Discharge:    Reason for Discharge: Pt has not met all goals but would like to discharge to self-management. Pt encouraged to follow back up with PT if using the pneumatic compression pump (once received) does not help to further reduce her swelling. Pt can return when she is able to attend PT 2-3x/week for several weeks and stay wrapped that entire time.      Equipment Issued: none    Discharge Plan: Patient to continue home program.    Josephine Mena, PT, DPT,  ROBERTO Mena, PT, DPT, ROBERTO

## 2023-06-28 ENCOUNTER — ANCILLARY PROCEDURE (OUTPATIENT)
Dept: MAMMOGRAPHY | Facility: CLINIC | Age: 78
End: 2023-06-28
Attending: FAMILY MEDICINE
Payer: COMMERCIAL

## 2023-06-28 DIAGNOSIS — Z12.31 VISIT FOR SCREENING MAMMOGRAM: ICD-10-CM

## 2023-06-28 PROCEDURE — 77067 SCR MAMMO BI INCL CAD: CPT

## 2023-07-09 ENCOUNTER — HEALTH MAINTENANCE LETTER (OUTPATIENT)
Age: 78
End: 2023-07-09

## 2023-12-14 ENCOUNTER — TELEPHONE (OUTPATIENT)
Dept: CARDIOLOGY | Facility: CLINIC | Age: 78
End: 2023-12-14
Payer: COMMERCIAL

## 2023-12-14 DIAGNOSIS — I48.0 PAROXYSMAL ATRIAL FIBRILLATION (H): ICD-10-CM

## 2023-12-14 RX ORDER — METOPROLOL SUCCINATE 25 MG/1
25 TABLET, EXTENDED RELEASE ORAL AT BEDTIME
Qty: 90 TABLET | Refills: 0 | Status: SHIPPED | OUTPATIENT
Start: 2023-12-14

## 2023-12-14 NOTE — TELEPHONE ENCOUNTER
[facility] Call Center    Phone Message    May a detailed message be left on voicemail: yes     Reason for Call: Medication Refill Request    Has the patient contacted the pharmacy for the refill? Yes   Name of medication being requested:   metoprolol succinate ER (TOPROL XL) 25 MG 24 hr tablet    Provider who prescribed the medication: Shirley Downing APRN CNP   Pharmacy:   [facility][id number] [address] [facility] [address]     Date medication is needed: asap     Action Taken: Other: cardio    Travel Screening: Not Applicable    Thank you!  [facility]

## 2024-03-27 ENCOUNTER — TRANSCRIBE ORDERS (OUTPATIENT)
Dept: VASCULAR SURGERY | Facility: CLINIC | Age: 79
End: 2024-03-27
Payer: COMMERCIAL

## 2024-03-27 DIAGNOSIS — M79.673 FOOT PAIN: Primary | ICD-10-CM

## 2024-03-29 DIAGNOSIS — R09.89 OTHER SPECIFIED SYMPTOMS AND SIGNS INVOLVING THE CIRCULATORY AND RESPIRATORY SYSTEMS: ICD-10-CM

## 2024-03-29 DIAGNOSIS — M79.673 FOOT PAIN: Primary | ICD-10-CM

## 2024-04-03 ENCOUNTER — ANCILLARY PROCEDURE (OUTPATIENT)
Dept: VASCULAR ULTRASOUND | Facility: CLINIC | Age: 79
End: 2024-04-03
Attending: PODIATRIST
Payer: COMMERCIAL

## 2024-04-03 DIAGNOSIS — M79.673 FOOT PAIN: ICD-10-CM

## 2024-04-03 DIAGNOSIS — R09.89 OTHER SPECIFIED SYMPTOMS AND SIGNS INVOLVING THE CIRCULATORY AND RESPIRATORY SYSTEMS: ICD-10-CM

## 2024-04-03 PROCEDURE — 93925 LOWER EXTREMITY STUDY: CPT

## 2024-04-03 PROCEDURE — 93923 UPR/LXTR ART STDY 3+ LVLS: CPT

## 2024-04-03 PROCEDURE — 93923 UPR/LXTR ART STDY 3+ LVLS: CPT | Mod: 26 | Performed by: SURGERY

## 2024-04-03 PROCEDURE — 93925 LOWER EXTREMITY STUDY: CPT | Mod: 26 | Performed by: SURGERY

## 2024-07-10 ENCOUNTER — ANCILLARY PROCEDURE (OUTPATIENT)
Dept: MAMMOGRAPHY | Facility: CLINIC | Age: 79
End: 2024-07-10
Attending: FAMILY MEDICINE
Payer: COMMERCIAL

## 2024-07-10 DIAGNOSIS — Z12.31 VISIT FOR SCREENING MAMMOGRAM: ICD-10-CM

## 2024-07-10 PROCEDURE — 77063 BREAST TOMOSYNTHESIS BI: CPT

## 2024-08-31 ENCOUNTER — HEALTH MAINTENANCE LETTER (OUTPATIENT)
Age: 79
End: 2024-08-31